# Patient Record
Sex: FEMALE | Race: WHITE | URBAN - METROPOLITAN AREA
[De-identification: names, ages, dates, MRNs, and addresses within clinical notes are randomized per-mention and may not be internally consistent; named-entity substitution may affect disease eponyms.]

---

## 2021-09-16 ENCOUNTER — HOSPITAL ENCOUNTER (INPATIENT)
Age: 31
LOS: 2 days | Discharge: HOME | DRG: 751 | End: 2021-09-19
Attending: EMERGENCY MEDICINE | Admitting: PSYCHIATRY & NEUROLOGY
Payer: MEDICARE

## 2021-09-16 DIAGNOSIS — F32.A DEPRESSION WITH SUICIDAL IDEATION: Primary | ICD-10-CM

## 2021-09-16 DIAGNOSIS — R45.851 DEPRESSION WITH SUICIDAL IDEATION: Primary | ICD-10-CM

## 2021-09-16 LAB
ABSOLUTE EOS #: 0 K/UL (ref 0–0.4)
ABSOLUTE IMMATURE GRANULOCYTE: ABNORMAL K/UL (ref 0–0.3)
ABSOLUTE LYMPH #: 1.8 K/UL (ref 1–4.8)
ABSOLUTE MONO #: 0.5 K/UL (ref 0.1–1.3)
ACETAMINOPHEN LEVEL: <5 UG/ML (ref 10–30)
ALBUMIN SERPL-MCNC: 4.6 G/DL (ref 3.5–5.2)
ALBUMIN/GLOBULIN RATIO: ABNORMAL (ref 1–2.5)
ALP BLD-CCNC: 79 U/L (ref 35–104)
ALT SERPL-CCNC: 13 U/L (ref 5–33)
AMPHETAMINE SCREEN URINE: POSITIVE
ANION GAP SERPL CALCULATED.3IONS-SCNC: 10 MMOL/L (ref 9–17)
AST SERPL-CCNC: 16 U/L
BARBITURATE SCREEN URINE: NEGATIVE
BASOPHILS # BLD: 1 % (ref 0–2)
BASOPHILS ABSOLUTE: 0.1 K/UL (ref 0–0.2)
BENZODIAZEPINE SCREEN, URINE: NEGATIVE
BILIRUB SERPL-MCNC: 0.32 MG/DL (ref 0.3–1.2)
BUN BLDV-MCNC: 6 MG/DL (ref 6–20)
BUN/CREAT BLD: ABNORMAL (ref 9–20)
BUPRENORPHINE URINE: ABNORMAL
CALCIUM SERPL-MCNC: 9.4 MG/DL (ref 8.6–10.4)
CANNABINOID SCREEN URINE: NEGATIVE
CHLORIDE BLD-SCNC: 103 MMOL/L (ref 98–107)
CO2: 25 MMOL/L (ref 20–31)
COCAINE METABOLITE, URINE: NEGATIVE
CREAT SERPL-MCNC: 0.57 MG/DL (ref 0.5–0.9)
DIFFERENTIAL TYPE: ABNORMAL
EOSINOPHILS RELATIVE PERCENT: 0 % (ref 0–4)
ETHANOL PERCENT: <0.01 %
ETHANOL: <10 MG/DL
GFR AFRICAN AMERICAN: >60 ML/MIN
GFR NON-AFRICAN AMERICAN: >60 ML/MIN
GFR SERPL CREATININE-BSD FRML MDRD: ABNORMAL ML/MIN/{1.73_M2}
GFR SERPL CREATININE-BSD FRML MDRD: ABNORMAL ML/MIN/{1.73_M2}
GLUCOSE BLD-MCNC: 105 MG/DL (ref 70–99)
HCG QUALITATIVE: NEGATIVE
HCT VFR BLD CALC: 36.7 % (ref 36–46)
HEMOGLOBIN: 12.6 G/DL (ref 12–16)
IMMATURE GRANULOCYTES: ABNORMAL %
INFLUENZA A: NEGATIVE
INFLUENZA B: NEGATIVE
LYMPHOCYTES # BLD: 16 % (ref 24–44)
MCH RBC QN AUTO: 28.9 PG (ref 26–34)
MCHC RBC AUTO-ENTMCNC: 34.4 G/DL (ref 31–37)
MCV RBC AUTO: 83.9 FL (ref 80–100)
MDMA URINE: ABNORMAL
METHADONE SCREEN, URINE: NEGATIVE
METHAMPHETAMINE, URINE: ABNORMAL
MONOCYTES # BLD: 4 % (ref 1–7)
NRBC AUTOMATED: ABNORMAL PER 100 WBC
OPIATES, URINE: NEGATIVE
OXYCODONE SCREEN URINE: NEGATIVE
PDW BLD-RTO: 13.7 % (ref 11.5–14.9)
PHENCYCLIDINE, URINE: POSITIVE
PLATELET # BLD: 421 K/UL (ref 150–450)
PLATELET ESTIMATE: ABNORMAL
PMV BLD AUTO: 6.8 FL (ref 6–12)
POTASSIUM SERPL-SCNC: 4.3 MMOL/L (ref 3.7–5.3)
PROPOXYPHENE, URINE: ABNORMAL
RBC # BLD: 4.38 M/UL (ref 4–5.2)
RBC # BLD: ABNORMAL 10*6/UL
SALICYLATE LEVEL: <1 MG/DL (ref 3–10)
SARS-COV-2 RNA, RT PCR: NOT DETECTED
SEG NEUTROPHILS: 79 % (ref 36–66)
SEGMENTED NEUTROPHILS ABSOLUTE COUNT: 9.1 K/UL (ref 1.3–9.1)
SODIUM BLD-SCNC: 138 MMOL/L (ref 135–144)
SOURCE: NORMAL
SPECIMEN DESCRIPTION: NORMAL
TEST INFORMATION: ABNORMAL
TOTAL PROTEIN: 8 G/DL (ref 6.4–8.3)
TRICYCLIC ANTIDEPRESSANTS, UR: ABNORMAL
WBC # BLD: 11.6 K/UL (ref 3.5–11)
WBC # BLD: ABNORMAL 10*3/UL

## 2021-09-16 PROCEDURE — G0480 DRUG TEST DEF 1-7 CLASSES: HCPCS

## 2021-09-16 PROCEDURE — 99281 EMR DPT VST MAYX REQ PHY/QHP: CPT

## 2021-09-16 PROCEDURE — 80307 DRUG TEST PRSMV CHEM ANLYZR: CPT

## 2021-09-16 PROCEDURE — 80143 DRUG ASSAY ACETAMINOPHEN: CPT

## 2021-09-16 PROCEDURE — 80053 COMPREHEN METABOLIC PANEL: CPT

## 2021-09-16 PROCEDURE — 84703 CHORIONIC GONADOTROPIN ASSAY: CPT

## 2021-09-16 PROCEDURE — 80179 DRUG ASSAY SALICYLATE: CPT

## 2021-09-16 PROCEDURE — 36415 COLL VENOUS BLD VENIPUNCTURE: CPT

## 2021-09-16 PROCEDURE — 87636 SARSCOV2 & INF A&B AMP PRB: CPT

## 2021-09-16 PROCEDURE — 85025 COMPLETE CBC W/AUTO DIFF WBC: CPT

## 2021-09-16 ASSESSMENT — ENCOUNTER SYMPTOMS
ABDOMINAL PAIN: 0
SHORTNESS OF BREATH: 0
EYE PAIN: 0
BACK PAIN: 0
COLOR CHANGE: 0

## 2021-09-17 PROBLEM — F32.A DEPRESSION WITH SUICIDAL IDEATION: Status: ACTIVE | Noted: 2021-09-17

## 2021-09-17 PROBLEM — F33.2 MDD (MAJOR DEPRESSIVE DISORDER), RECURRENT SEVERE, WITHOUT PSYCHOSIS (HCC): Status: ACTIVE | Noted: 2021-09-17

## 2021-09-17 PROBLEM — F33.2 SEVERE RECURRENT MAJOR DEPRESSION WITHOUT PSYCHOTIC FEATURES (HCC): Status: ACTIVE | Noted: 2021-09-17

## 2021-09-17 PROBLEM — R45.851 DEPRESSION WITH SUICIDAL IDEATION: Status: ACTIVE | Noted: 2021-09-17

## 2021-09-17 PROCEDURE — 1240000000 HC EMOTIONAL WELLNESS R&B

## 2021-09-17 PROCEDURE — 6370000000 HC RX 637 (ALT 250 FOR IP): Performed by: PSYCHIATRY & NEUROLOGY

## 2021-09-17 PROCEDURE — 90792 PSYCH DIAG EVAL W/MED SRVCS: CPT | Performed by: PSYCHIATRY & NEUROLOGY

## 2021-09-17 PROCEDURE — APPSS60 APP SPLIT SHARED TIME 46-60 MINUTES: Performed by: NURSE PRACTITIONER

## 2021-09-17 PROCEDURE — 6370000000 HC RX 637 (ALT 250 FOR IP): Performed by: NURSE PRACTITIONER

## 2021-09-17 RX ORDER — DIPHENHYDRAMINE HYDROCHLORIDE 50 MG/ML
50 INJECTION INTRAMUSCULAR; INTRAVENOUS EVERY 4 HOURS PRN
Status: DISCONTINUED | OUTPATIENT
Start: 2021-09-17 | End: 2021-09-19 | Stop reason: HOSPADM

## 2021-09-17 RX ORDER — TRAZODONE HYDROCHLORIDE 100 MG/1
100 TABLET ORAL NIGHTLY PRN
COMMUNITY

## 2021-09-17 RX ORDER — METHYLPHENIDATE HYDROCHLORIDE 60 MG/1
60 CAPSULE, EXTENDED RELEASE ORAL EVERY MORNING
COMMUNITY

## 2021-09-17 RX ORDER — METHYLPHENIDATE HYDROCHLORIDE 60 MG/1
60 CAPSULE, EXTENDED RELEASE ORAL DAILY
Status: DISCONTINUED | OUTPATIENT
Start: 2021-09-18 | End: 2021-09-19 | Stop reason: HOSPADM

## 2021-09-17 RX ORDER — METHYLPHENIDATE HYDROCHLORIDE 5 MG/1
20 TABLET ORAL
Status: DISCONTINUED | OUTPATIENT
Start: 2021-09-17 | End: 2021-09-17 | Stop reason: CLARIF

## 2021-09-17 RX ORDER — LORAZEPAM 1 MG/1
2 TABLET ORAL EVERY 4 HOURS PRN
Status: DISCONTINUED | OUTPATIENT
Start: 2021-09-17 | End: 2021-09-19 | Stop reason: HOSPADM

## 2021-09-17 RX ORDER — TRAZODONE HYDROCHLORIDE 50 MG/1
50 TABLET ORAL NIGHTLY PRN
Status: DISCONTINUED | OUTPATIENT
Start: 2021-09-17 | End: 2021-09-17

## 2021-09-17 RX ORDER — SERTRALINE HYDROCHLORIDE 100 MG/1
100 TABLET, FILM COATED ORAL NIGHTLY
Status: DISCONTINUED | OUTPATIENT
Start: 2021-09-17 | End: 2021-09-19 | Stop reason: HOSPADM

## 2021-09-17 RX ORDER — SERTRALINE HYDROCHLORIDE 100 MG/1
100 TABLET, FILM COATED ORAL NIGHTLY
COMMUNITY

## 2021-09-17 RX ORDER — HALOPERIDOL 5 MG/ML
5 INJECTION INTRAMUSCULAR EVERY 4 HOURS PRN
Status: DISCONTINUED | OUTPATIENT
Start: 2021-09-17 | End: 2021-09-19 | Stop reason: HOSPADM

## 2021-09-17 RX ORDER — MAGNESIUM HYDROXIDE/ALUMINUM HYDROXICE/SIMETHICONE 120; 1200; 1200 MG/30ML; MG/30ML; MG/30ML
30 SUSPENSION ORAL EVERY 6 HOURS PRN
Status: DISCONTINUED | OUTPATIENT
Start: 2021-09-17 | End: 2021-09-19 | Stop reason: HOSPADM

## 2021-09-17 RX ORDER — IBUPROFEN 400 MG/1
400 TABLET ORAL EVERY 6 HOURS PRN
Status: DISCONTINUED | OUTPATIENT
Start: 2021-09-17 | End: 2021-09-19 | Stop reason: HOSPADM

## 2021-09-17 RX ORDER — TRAZODONE HYDROCHLORIDE 100 MG/1
100 TABLET ORAL NIGHTLY PRN
Status: DISCONTINUED | OUTPATIENT
Start: 2021-09-17 | End: 2021-09-19 | Stop reason: HOSPADM

## 2021-09-17 RX ORDER — LAMOTRIGINE 150 MG/1
150 TABLET ORAL DAILY
Status: DISCONTINUED | OUTPATIENT
Start: 2021-09-17 | End: 2021-09-19 | Stop reason: HOSPADM

## 2021-09-17 RX ORDER — LAMOTRIGINE 150 MG/1
150 TABLET ORAL DAILY
COMMUNITY

## 2021-09-17 RX ORDER — POLYETHYLENE GLYCOL 3350 17 G/17G
17 POWDER, FOR SOLUTION ORAL DAILY PRN
Status: DISCONTINUED | OUTPATIENT
Start: 2021-09-17 | End: 2021-09-19 | Stop reason: HOSPADM

## 2021-09-17 RX ORDER — ACETAMINOPHEN 325 MG/1
650 TABLET ORAL EVERY 4 HOURS PRN
Status: DISCONTINUED | OUTPATIENT
Start: 2021-09-17 | End: 2021-09-19 | Stop reason: HOSPADM

## 2021-09-17 RX ORDER — HALOPERIDOL 5 MG
5 TABLET ORAL EVERY 4 HOURS PRN
Status: DISCONTINUED | OUTPATIENT
Start: 2021-09-17 | End: 2021-09-19 | Stop reason: HOSPADM

## 2021-09-17 RX ORDER — LORAZEPAM 2 MG/ML
2 INJECTION INTRAMUSCULAR EVERY 4 HOURS PRN
Status: DISCONTINUED | OUTPATIENT
Start: 2021-09-17 | End: 2021-09-19 | Stop reason: HOSPADM

## 2021-09-17 RX ORDER — HYDROXYZINE 50 MG/1
50 TABLET, FILM COATED ORAL 3 TIMES DAILY PRN
Status: DISCONTINUED | OUTPATIENT
Start: 2021-09-17 | End: 2021-09-19 | Stop reason: HOSPADM

## 2021-09-17 RX ADMIN — LAMOTRIGINE 150 MG: 150 TABLET ORAL at 15:10

## 2021-09-17 RX ADMIN — SERTRALINE HYDROCHLORIDE 100 MG: 100 TABLET ORAL at 21:05

## 2021-09-17 RX ADMIN — TRAZODONE HYDROCHLORIDE 50 MG: 50 TABLET ORAL at 01:27

## 2021-09-17 RX ADMIN — TRAZODONE HYDROCHLORIDE 100 MG: 100 TABLET ORAL at 21:05

## 2021-09-17 RX ADMIN — HYDROXYZINE HYDROCHLORIDE 50 MG: 50 TABLET ORAL at 21:05

## 2021-09-17 ASSESSMENT — PAIN SCALES - GENERAL
PAINLEVEL_OUTOF10: 0
PAINLEVEL_OUTOF10: 0

## 2021-09-17 ASSESSMENT — PATIENT HEALTH QUESTIONNAIRE - PHQ9: SUM OF ALL RESPONSES TO PHQ QUESTIONS 1-9: 8

## 2021-09-17 ASSESSMENT — SLEEP AND FATIGUE QUESTIONNAIRES
DO YOU HAVE DIFFICULTY SLEEPING: NO
DO YOU USE A SLEEP AID: NO
AVERAGE NUMBER OF SLEEP HOURS: 8

## 2021-09-17 ASSESSMENT — LIFESTYLE VARIABLES: HISTORY_ALCOHOL_USE: NO

## 2021-09-17 NOTE — PLAN OF CARE
[facility]  Initial Interdisciplinary Treatment Plan NO      Original treatment plan Date & Time: 9/17/2021   0834    Admission Type:  Admission Type: Involuntary    Reason for admission:   Reason for Admission: Suicidal to overdose on medications    Estimated Length of Stay:  5-7days  Estimated Discharge Date: to be determined by physician    PATIENT STRENGTHS:  Patient Strengths:Strengths: Medication Compliance, Positive Support  Patient Strengths and Limitations:Limitations: Hopeless about future, Difficulty problem solving/relies on others to help solve problems, Multiple barriers to leisure interests (therapist at [facility]; reports goes on vacations/unable to see her when pt needs her)  Addictive Behavior: Addictive Behavior  In the past 3 months, have you felt or has someone told you that you have a problem with:  : None  Do you have a history of Chemical Use?: No  Do you have a history of Alcohol Use?: No  Do you have a history of Street Drug Abuse?: No  Histroy of Prescripton Drug Abuse?: No  Medical Problems:History reviewed. No pertinent past medical history.   Status EXAM:Status and Exam  Normal: No  Facial Expression: Flat  Affect: Appropriate  Level of Consciousness: Alert  Mood:Normal: No (rate dep 3/10; rate anx 3/10; reported feeling better during assessment)  Mood: Depressed, Anxious  Motor Activity:Normal: Yes  Interview Behavior: Cooperative  Preception: [place] to Person, [place] Mer[place] to Time, [place] to Place, [place] to Situation  Attention:Normal: No  Attention: Distractible  Thought Processes: Blocking  Thought Content:Normal: No  Thought Content: Obsessions, Preoccupations (loss of uncle 3 years ago)  Hallucinations: None (denied current auditory/visual hallucinations)  Delusions: No  Memory:Normal: Yes  Insight and Judgment: No  Insight and Judgment: Poor Judgment, Poor Insight  Present Suicidal Ideation: No (denied current SI, reported feels safe, reported she will come talk to staff if this would change)  Present Homicidal Ideation: No (denied)    EDUCATION:   Learner Progress Toward Treatment Goals: reviewed group plans and strategies for care    Method:group therapy, medication compliance, individualized assessments and care planning    Outcome: needs reinforcement    PATIENT GOALS: to be discussed with patient within 72 hours    PLAN/TREATMENT RECOMMENDATIONS:     continue group therapy , medications compliance, goal setting, individualized assessments and care, continue to monitor pt on unit      SHORT-TERM GOALS:   Time frame for Short-Term Goals: 5-7 days    LONG-TERM GOALS:  Time frame for Long-Term Goals: 6 months  Members Present in Team Meeting: See Signature Sheet    Shayna Mcnally
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
## 2021-09-17 NOTE — GROUP NOTE
Group Therapy Note    Date: 9/17/2021    Group Start Time: 0900  Group End Time: 0915  Group Topic: Community Meeting    DONALDO Lynn        Group Therapy Note    Pt did not attend  community meeting group d/t resting in room despite staff invitation to attend. 1:1 talk time offered as alternative to group session, pt declined.

## 2021-09-17 NOTE — PROGRESS NOTES
Behavioral Services  Medicare Certification Upon Admission    I certify that this patient's inpatient psychiatric hospital admission is medically necessary for:    [x] (1) Treatment which could reasonably be expected to improve this patient's condition,       [x] (2) Or for diagnostic study;     AND     [x](2) The inpatient psychiatric services are provided while the individual is under the care of a physician and are included in the individualized plan of care.     Estimated length of stay/service 3 to 5 days    Plan for post-hospital care Home with outpatient community mental health follow-up    Electronically signed by Margo Tinajero MD on 9/17/2021 at 2:31 PM

## 2021-09-17 NOTE — ED PROVIDER NOTES
EMERGENCY DEPARTMENT ENCOUNTER    Pt Name: Karel Carter  MRN: [id number]  [place] 1990  Date of evaluation: 9/16/21  CHIEF COMPLAINT     No chief complaint on file. HISTORY OF PRESENT ILLNESS   68-year-old female presents for mental health evaluation. Patient reports has been increasingly depressed over the last couple days, patient states that it stems from the loss of her uncle a few years ago after he committed suicide states that she has not that without, states that she is not been able to meet with her therapist recently had been feeling increasingly suicidal in the last couple days. Patient reportedly was trying to overdose last night and police were called, was not brought to the ED yesterday, please were called out again today after patient allegedly tried to take an overdose of pills, was able to be stopped by her mom and patient did not actually take anything. Patient does acknowledge feeling suicidal, does acknowledge trying to take pills, denies any homicidal ideation, does admit prior suicide attempt via overdose, denies any visual or auditory hallucinations, denies any recent alcohol or drug use, denies any other complaints at this time. The history is provided by the patient. REVIEW OF SYSTEMS     Review of Systems   Constitutional: Negative for fever. HENT: Negative for congestion and ear pain. Eyes: Negative for pain. Respiratory: Negative for shortness of breath. Cardiovascular: Negative for chest pain, palpitations and leg swelling. Gastrointestinal: Negative for abdominal pain. Genitourinary: Negative for dysuria and flank pain. Musculoskeletal: Negative for back pain. Skin: Negative for color change. Neurological: Negative for numbness and headaches. Psychiatric/Behavioral: Positive for suicidal ideas. Negative for confusion. All other systems reviewed and are negative. PASTMEDICAL HISTORY   History reviewed.  No pertinent past medical history. Past Problem List  Patient Active Problem List   Diagnosis Code    Depression with suicidal ideation F32.9, R45.851     SURGICAL HISTORY     History reviewed. No pertinent surgical history. CURRENT MEDICATIONS       There are no discharge medications for this patient. ALLERGIES     has No Known Allergies. FAMILY HISTORY     has no family status information on file. SOCIAL HISTORY       Social History     Tobacco Use    Smoking status: Never Smoker    Smokeless tobacco: Never Used   Vaping Use    Vaping Use: Never used   Substance Use Topics    Alcohol use: Never    Drug use: Never     PHYSICAL EXAM     INITIAL VITALS: /87   Pulse 78   Temp 97.8 °F (36.6 °C)   Resp 16   Ht 5' 2\" (1.575 m)   Wt 160 lb (72.6 kg)   SpO2 99%   BMI 29.26 kg/m²    Physical Exam  Vitals and nursing note reviewed. Constitutional:       General: She is not in acute distress. Appearance: Normal appearance. She is not toxic-appearing. HENT:      Head: Normocephalic and atraumatic. Nose: Nose normal.      Mouth/Throat:      Mouth: Mucous membranes are moist.      Pharynx: Oropharynx is clear. Eyes:      Extraocular Movements: Extraocular movements intact. Conjunctiva/sclera: Conjunctivae normal.   Cardiovascular:      Rate and Rhythm: Normal rate and regular rhythm. Pulses: Normal pulses. Heart sounds: Normal heart sounds. Pulmonary:      Effort: Pulmonary effort is normal.      Breath sounds: Normal breath sounds. Abdominal:      General: Bowel sounds are normal. There is no distension. Palpations: Abdomen is soft. Tenderness: There is no abdominal tenderness. Musculoskeletal:         General: Normal range of motion. Cervical back: Normal range of motion. Skin:     General: Skin is warm and dry. Capillary Refill: Capillary refill takes less than 2 seconds. Neurological:      General: No focal deficit present.       Mental Status: She is alert and oriented to person, place, and time. Cranial Nerves: Cranial nerves are intact. Sensory: Sensation is intact. Motor: Motor function is intact. Psychiatric:         Attention and Perception: She does not perceive auditory or visual hallucinations. Mood and Affect: Mood normal. Affect is tearful. Thought Content: Thought content includes suicidal ideation. Thought content includes suicidal plan. MEDICAL DECISION MAKIN-year-old female presents for mental health evaluation. On initial exam patient in no acute distress, vitals are stable, patient does not suicidal ideation and does acknowledge that she was going to take pills tonight, will check labs for medical clearance, patient to be evaluated by social work as well    Labs reviewed and unremarkable    Patient was placed on a pink slip by PD    Discussed with social work, both myself and social work in agreement patient would benefit from admission, patient medically clear         CRITICAL CARE:       PROCEDURES:    Procedures    DIAGNOSTIC RESULTS   EKG:All EKG's are interpreted by the Emergency Department Physician who either signs or Co-signs this chart in the absence of a cardiologist.        RADIOLOGY:All plain film, CT, MRI, and formal ultrasound images (except ED bedside ultrasound) are read by the radiologist, see reports below, unless otherwisenoted in MDM or here. No orders to display     LABS: All lab results were reviewed by myself, and all abnormals are listed below.   Labs Reviewed   CBC WITH AUTO DIFFERENTIAL - Abnormal; Notable for the following components:       Result Value    WBC 11.6 (*)     Seg Neutrophils 79 (*)     Lymphocytes 16 (*)     All other components within normal limits   COMPREHENSIVE METABOLIC PANEL W/ REFLEX TO MG FOR LOW K - Abnormal; Notable for the following components:    Glucose 105 (*)     All other components within normal limits   SALICYLATE LEVEL - Abnormal; Notable for the following components:    Salicylate Lvl <1 (*)     All other components within normal limits   ACETAMINOPHEN LEVEL - Abnormal; Notable for the following components:    Acetaminophen Level <5 (*)     All other components within normal limits   URINE DRUG SCREEN - Abnormal; Notable for the following components:    Amphetamine Screen, Ur POSITIVE (*)     Phencyclidine, Urine POSITIVE (*)     All other components within normal limits   COVID-19 & INFLUENZA COMBO   HCG, SERUM, QUALITATIVE   ETHANOL       EMERGENCY DEPARTMENTCOURSE:         Vitals:    Vitals:    09/16/21 2230 09/17/21 0120   BP: 110/63 100/87   Pulse: 61 78   Resp: 16 16   Temp: 97.7 °F (36.5 °C) 97.8 °F (36.6 °C)   TempSrc: Oral    SpO2: 100% 99%   Weight: 160 lb (72.6 kg) 160 lb (72.6 kg)   Height: 5' 2\" (1.575 m) 5' 2\" (1.575 m)       The patient was given the following medications while in the emergency department:  Orders Placed This Encounter   Medications    acetaminophen (TYLENOL) tablet 650 mg    aluminum & magnesium hydroxide-simethicone (MAALOX) 200-200-20 MG/5ML suspension 30 mL    hydrOXYzine (ATARAX) tablet 50 mg    ibuprofen (ADVIL;MOTRIN) tablet 400 mg    DISCONTD: traZODone (DESYREL) tablet 50 mg    polyethylene glycol (GLYCOLAX) packet 17 g    nicotine polacrilex (NICORETTE) gum 2 mg    AND Linked Order Group     haloperidol (HALDOL) tablet 5 mg     LORazepam (ATIVAN) tablet 2 mg    AND Linked Order Group     haloperidol lactate (HALDOL) injection 5 mg     LORazepam (ATIVAN) injection 2 mg     diphenhydrAMINE (BENADRYL) injection 50 mg    traZODone (DESYREL) tablet 50 mg     CONSULTS:  None    FINAL IMPRESSION      1. Depression with suicidal ideation          DISPOSITION/PLAN   DISPOSITION Admitted 09/17/2021 12:05:22 AM      PATIENT REFERRED TO:  No follow-up provider specified. DISCHARGE MEDICATIONS:  There are no discharge medications for this patient.     [facility] DO  Attending Emergency Physician                  Radhika Shaikh,   09/17/21 8058
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
## 2021-09-17 NOTE — ED NOTES
Paged lab at 1532 per [place] in the [facility].      Jayce Barkley  09/16/21 2059
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
## 2021-09-17 NOTE — BH NOTE
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
## 2021-09-17 NOTE — GROUP NOTE
Group Therapy Note    Date: 9/17/2021    Group Start Time: 1330  Group End Time: 1440  Group Topic: Music Therapy    [facility]    [facility]        Group Therapy Note    Pt did not attend MUSIC THERAPY/EMOITONS group d/t resting in room despite staff invitation to attend. 1:1 talk time offered as alternative to group session, pt declined.

## 2021-09-18 PROCEDURE — 6370000000 HC RX 637 (ALT 250 FOR IP): Performed by: NURSE PRACTITIONER

## 2021-09-18 PROCEDURE — 99232 SBSQ HOSP IP/OBS MODERATE 35: CPT | Performed by: PSYCHIATRY & NEUROLOGY

## 2021-09-18 PROCEDURE — APPSS15 APP SPLIT SHARED TIME 0-15 MINUTES: Performed by: NURSE PRACTITIONER

## 2021-09-18 PROCEDURE — 1240000000 HC EMOTIONAL WELLNESS R&B

## 2021-09-18 PROCEDURE — 6370000000 HC RX 637 (ALT 250 FOR IP): Performed by: PSYCHIATRY & NEUROLOGY

## 2021-09-18 RX ADMIN — LAMOTRIGINE 150 MG: 150 TABLET ORAL at 08:25

## 2021-09-18 RX ADMIN — TRAZODONE HYDROCHLORIDE 100 MG: 100 TABLET ORAL at 21:47

## 2021-09-18 RX ADMIN — SERTRALINE HYDROCHLORIDE 100 MG: 100 TABLET ORAL at 21:47

## 2021-09-18 RX ADMIN — METHYLPHENIDATE HYDROCHLORIDE 60 MG: 60 CAPSULE, EXTENDED RELEASE ORAL at 08:25

## 2021-09-18 RX ADMIN — HYDROXYZINE HYDROCHLORIDE 50 MG: 50 TABLET ORAL at 21:47

## 2021-09-18 NOTE — PROGRESS NOTES
Daily Progress Note  9/18/2021    Patient Name: Burgess Yost    CHIEF COMPLAINT: Depression with suicidal ideation         SUBJECTIVE:      Patient is seen today for a follow up assessment. She is interviewed in the sensory room, she was observed previously watching television on the milieu with peers. She reports that she is feeling \"a lot more hopeful\". She reports that she has felt very supported by staff and reports gratitude that she has been able to express herself emotionally without fearing punitive prolonged hospitalization. She reports that she has been able to talk to some of her coworkers who have also been supportive of her situation. She states that she spent time with social work going over potential new places for therapy. She feels hopeful that if she is able to have regular therapist to help stabilize her emotional lability things will a lot better for her. She does endorse that without that she feels that she will quickly decompensate because she is unable to handle her current life stressors. She endorses improved suicidal ideation specifically because of the structured environment. She is hopeful that she will be able to return to work this week, we did explore the importance of stability of symptoms prior to discharge to make sure that there is a good plan in place so that she does not decompensate and require further hospitalization. She is agreeable with this plan of care, she has been compliant with medications.     Appetite:  [x] Adequate/Unchanged  [] Increased  [] Decreased      Sleep:       [x] Adequate/Unchanged  [] Fair  [] Poor      Group Attendance on Unit:   [] Yes  [x] Selectively    [] No    Medication Side Effects: Denies         Mental Status Exam  Level of consciousness: Alert and awake   Appearance: Appropriate attire for setting, seated in chair, with good  grooming and hygiene   Behavior/Motor: Approachable, pleasant  Attitude toward examiner: Cooperative, attentive, avoids eye contact  Speech: Normal rate, volume and tone  Mood: \"Better\"  Affect: Congruent, less withdrawn  Thought processes: Linear, coherent and goal directed  Thought content: Forward thinking, discharge focused, Denies homicidal ideation  Suicidal Ideation: Reports improvement in suicidal ideations, contracts for safety on the unit. Delusions: No evidence of delusions. Perceptual Disturbance: Denies, patient does not appear to be responding to internal stimuli. Cognition: Oriented to self, location, time, and situation  Memory: intact  Insight: fair   Judgement: poor       Data   height is 5' 2\" (1.575 m) and weight is 160 lb (72.6 kg). Her oral temperature is 96.7 °F (35.9 °C). Her blood pressure is 106/64 and her pulse is 95. Her respiration is 14 and oxygen saturation is 98%.    Labs:   Admission on 09/16/2021   Component Date Value Ref Range Status    WBC 09/16/2021 11.6* 3.5 - 11.0 k/uL Final    RBC 09/16/2021 4.38  4.0 - 5.2 m/uL Final    Hemoglobin 09/16/2021 12.6  12.0 - 16.0 g/dL Final    Hematocrit 09/16/2021 36.7  36 - 46 % Final    MCV 09/16/2021 83.9  80 - 100 fL Final    MCH 09/16/2021 28.9  26 - 34 pg Final    MCHC 09/16/2021 34.4  31 - 37 g/dL Final    RDW 09/16/2021 13.7  11.5 - 14.9 % Final    Platelets 61/40/0386 421  150 - 450 k/uL Final    MPV 09/16/2021 6.8  6.0 - 12.0 fL Final    NRBC Automated 09/16/2021 NOT REPORTED  per 100 WBC Final    Differential Type 09/16/2021 NOT REPORTED   Final    Seg Neutrophils 09/16/2021 79* 36 - 66 % Final    Lymphocytes 09/16/2021 16* 24 - 44 % Final    Monocytes 09/16/2021 4  1 - 7 % Final    Eosinophils % 09/16/2021 0  0 - 4 % Final    Basophils 09/16/2021 1  0 - 2 % Final    Immature Granulocytes 09/16/2021 NOT REPORTED  0 % Final    Segs Absolute 09/16/2021 9.10  1.3 - 9.1 k/uL Final    Absolute Lymph # 09/16/2021 1.80  1.0 - 4.8 k/uL Final    Absolute Mono # 09/16/2021 0.50  0.1 - 1.3 k/uL Final    Absolute Eos # 09/16/2021 0.00  0.0 - 0.4 k/uL Final    Basophils Absolute 09/16/2021 0.10  0.0 - 0.2 k/uL Final    Absolute Immature Granulocyte 09/16/2021 NOT REPORTED  0.00 - 0.30 k/uL Final    WBC Morphology 09/16/2021 NOT REPORTED   Final    RBC Morphology 09/16/2021 NOT REPORTED   Final    Platelet Estimate 67/90/4155 NOT REPORTED   Final    Glucose 09/16/2021 105* 70 - 99 mg/dL Final    BUN 09/16/2021 6  6 - 20 mg/dL Final    CREATININE 09/16/2021 0.57  0.50 - 0.90 mg/dL Final    Bun/Cre Ratio 09/16/2021 NOT REPORTED  9 - 20 Final    Calcium 09/16/2021 9.4  8.6 - 10.4 mg/dL Final    Sodium 09/16/2021 138  135 - 144 mmol/L Final    Potassium 09/16/2021 4.3  3.7 - 5.3 mmol/L Final    Chloride 09/16/2021 103  98 - 107 mmol/L Final    CO2 09/16/2021 25  20 - 31 mmol/L Final    Anion Gap 09/16/2021 10  9 - 17 mmol/L Final    Alkaline Phosphatase 09/16/2021 79  35 - 104 U/L Final    ALT 09/16/2021 13  5 - 33 U/L Final    AST 09/16/2021 16  <32 U/L Final    Total Bilirubin 09/16/2021 0.32  0.3 - 1.2 mg/dL Final    Total Protein 09/16/2021 8.0  6.4 - 8.3 g/dL Final    Albumin 09/16/2021 4.6  3.5 - 5.2 g/dL Final    Albumin/Globulin Ratio 09/16/2021 NOT REPORTED  1.0 - 2.5 Final    GFR Non- 09/16/2021 >60  >60 mL/min Final    GFR  09/16/2021 >60  >60 mL/min Final    GFR Comment 09/16/2021        Final    Comment: Average GFR for 20-28 years old:   80 mL/min/1.73sq m  Chronic Kidney Disease:   <60 mL/min/1.73sq m  Kidney failure:   <15 mL/min/1.73sq m              eGFR calculated using average adult body mass. Additional eGFR calculator available at:        Popdust.br            GFR Staging 09/16/2021 NOT REPORTED   Final    hCG Qual 09/16/2021 NEGATIVE  NEGATIVE Final    Comment: Specimens with hCG levels near the threshold of the test (25 mIU/mL) may give a negative or   indeterminate result.   In such cases, another test should be performed with a new specimen   in 48-72 hours. If early pregnancy is suspected clinically in this setting, correlation   with quantitative serum b-hCG level is suggested.  Ethanol 09/16/2021 <10  <10 mg/dL Final    Ethanol percent 09/16/2021 <6.684  % Final    Salicylate Lvl 99/56/7686 <1* 3 - 10 mg/dL Final    Acetaminophen Level 09/16/2021 <5* 10 - 30 ug/mL Final    Amphetamine Screen, Ur 09/16/2021 POSITIVE* NEGATIVE Final    Comment:       (Positive cutoff 1000 ng/mL)                  Barbiturate Screen, Ur 09/16/2021 NEGATIVE  NEGATIVE Final    Comment:       (Positive cutoff 200 ng/mL)                  Benzodiazepine Screen, Urine 09/16/2021 NEGATIVE  NEGATIVE Final    Comment:       (Positive cutoff 200 ng/mL)                  Cocaine Metabolite, Urine 09/16/2021 NEGATIVE  NEGATIVE Final    Comment:       (Positive cutoff 300 ng/mL)                  Methadone Screen, Urine 09/16/2021 NEGATIVE  NEGATIVE Final    Comment:       (Positive cutoff 300 ng/mL)                  Opiates, Urine 09/16/2021 NEGATIVE  NEGATIVE Final    Comment:       (Positive cutoff 300 ng/mL)                  Phencyclidine, Urine 09/16/2021 POSITIVE* NEGATIVE Final    Comment:       (Positive cutoff 25 ng/mL)                  Propoxyphene, Urine 09/16/2021 NOT REPORTED  NEGATIVE Final    Cannabinoid Scrn, Ur 09/16/2021 NEGATIVE  NEGATIVE Final    Comment:       (Positive cutoff 50 ng/mL)                  Oxycodone Screen, Ur 09/16/2021 NEGATIVE  NEGATIVE Final    Comment:       (Positive cutoff 100 ng/mL)                  Methamphetamine, Urine 09/16/2021 NOT REPORTED  NEGATIVE Final    Tricyclic Antidepressants, Urine 09/16/2021 NOT REPORTED  NEGATIVE Final    MDMA, Urine 09/16/2021 NOT REPORTED  NEGATIVE Final    Buprenorphine Urine 09/16/2021 NOT REPORTED  NEGATIVE Final    Test Information 09/16/2021 Assay provides medical screening only.   The absence of expected drug(s) and/or metabolite(s) may indicate diluted or adulterated urine, limitations of testing or timing of collection. Final    Comment: Testing for legal purposes should be confirmed by another method. To request confirmation   of test result, please call the lab within 7 days of sample submission.  Specimen Description 09/16/2021 . NASOPHARYNGEAL SWAB   Final    Source 09/16/2021 . NASOPHARYNGEAL SWAB   Final    SARS-CoV-2 RNA, RT PCR 09/16/2021 Not Detected  Not Detected Final    Comment:       Testing was performed using [employer] In[employer] SARS-CoV-2 and Influenza A/B nucleic acid assay. This test is a multiplex Real-Time Reverse Transcriptase Polymerase Chain Reaction   (RT-PCR)-based in vitro diagnostic test intended for the qualitative detection of nucleic   acids from SARS-CoV-2,  influenza A, and influenza B in nasopharyngeal and nasal swab specimens for use under the   FDA's Emergency Use Authorization (EUA) only. Not Detected results do not preclude SARS-CoV-2 infection and should not be used as the sole   basis for patient management decisions. Negative results must be combined with clinical observations, patient history, and   epidemiological information. Fact sheet for Patients: FindDrives.pl  Fact sheet for Healthcare Providers: FindDrives.pl      INFLUENZA A 09/16/2021 NEGATIVE  NEGATIVE Final    INFLUENZA B 09/16/2021 NEGATIVE  NEGATIVE Final         Reviewed patient's current plan of care and vital signs with nursing staff.     Labs reviewed: [x] Yes    Medications  Current Facility-Administered Medications: acetaminophen (TYLENOL) tablet 650 mg, 650 mg, Oral, Q4H PRN  aluminum & magnesium hydroxide-simethicone (MAALOX) 200-200-20 MG/5ML suspension 30 mL, 30 mL, Oral, Q6H PRN  hydrOXYzine (ATARAX) tablet 50 mg, 50 mg, Oral, TID PRN  ibuprofen (ADVIL;MOTRIN) tablet 400 mg, 400 mg, Oral, Q6H PRN  polyethylene glycol (GLYCOLAX) packet 17 g, 17 g, Oral, Daily PRN  nicotine polacrilex (NICORETTE) gum 2 mg, 2 mg, Oral, PRN  haloperidol (HALDOL) tablet 5 mg, 5 mg, Oral, Q4H PRN **AND** LORazepam (ATIVAN) tablet 2 mg, 2 mg, Oral, Q4H PRN  haloperidol lactate (HALDOL) injection 5 mg, 5 mg, IntraMUSCular, Q4H PRN **AND** LORazepam (ATIVAN) injection 2 mg, 2 mg, IntraMUSCular, Q4H PRN **AND** diphenhydrAMINE (BENADRYL) injection 50 mg, 50 mg, IntraMUSCular, Q4H PRN  lamoTRIgine (LAMICTAL) tablet 150 mg, 150 mg, Oral, Daily  sertraline (ZOLOFT) tablet 100 mg, 100 mg, Oral, Nightly  traZODone (DESYREL) tablet 100 mg, 100 mg, Oral, Nightly PRN  methylphenidate (METADATE CD) extended release capsule 60 mg, 60 mg, Oral, Daily    ASSESSMENT  MDD (major depressive disorder), recurrent severe, without psychosis ([facility] [address]         PLAN  Patient symptoms are: Improving  Continue current medication regimen. Monitor need and frequency of PRN medications. Encourage participation in groups and milieu. Attempt to develop insight. Psycho-education conducted. Supportive Therapy conducted. Probable discharge is per attending physician. Successful discharge plan for this patient will include access to regular therapy, ideally through [place] since that is where her medication management occurs. Follow-up daily while inpatient. Patient continues to be monitored in the inpatient psychiatric facility at [facility] for safety and stabilization. Patient continues to need, on a daily basis, active treatment furnished directly by or requiring the supervision of inpatient psychiatric personnel. Electronically signed by LORI Alicea CNP on 9/18/2021 at 1:39 PM    **This report has been created using voice recognition software. It may contain minor errors which are inherent in voice recognition technology. **    I independently saw and evaluated the patient. I reviewed the nurse practitioners documentation above.   Any additional comments or changes to the nurse practitioners documentation are

## 2021-09-18 NOTE — PLAN OF CARE
5 [facility]  Day 3 Interdisciplinary Treatment Plan NOTE    Review Date & Time: 9/18/2021 5490    Admission Type:   Admission Type: Involuntary    Reason for admission:  Reason for Admission: Suicidal to overdose on medications  Estimated Length of Stay: 5-7 days  Estimated Discharge Date Update: to be determined by physician    PATIENT STRENGTHS:  Patient Strengths Strengths: Medication Compliance, Positive Support  Patient Strengths and Limitations:Limitations: Unrealistic self-view, External locus of control, Demonstrates discomfort with /lack of social skills, Difficult relationships / poor social skills, Difficulty problem solving/relies on others to help solve problems  Addictive Behavior:Addictive Behavior  In the past 3 months, have you felt or has someone told you that you have a problem with:  : None  Do you have a history of Chemical Use?: No  Do you have a history of Alcohol Use?: No  Do you have a history of Street Drug Abuse?: No  Histroy of Prescripton Drug Abuse?: No  Medical Problems:History reviewed. No pertinent past medical history.     Risk:  Fall RiskTotal: 53  Omid Scale Omid Scale Score: 22  BVC Total: 0  Change in scores no Changes to plan of Care no    Status EXAM:   Status and Exam  Normal: No  Facial Expression: Sad, Worried  Affect: Appropriate  Level of Consciousness: Alert  Mood:Normal: No  Mood: Depressed, Anxious  Motor Activity:Normal: Yes  Interview Behavior: Cooperative  Preception: [place] to Person, Yolis Colander to Time, [place] to Place, [place] to Situation  Attention:Normal: No  Attention: Distractible  Thought Processes: Blocking  Thought Content:Normal: No  Thought Content: Preoccupations  Hallucinations: None  Delusions: No  Memory:Normal: No  Memory: Poor Recent  Insight and Judgment: No  Insight and Judgment: Poor Judgment, Poor Insight  Present Suicidal Ideation: No  Present Homicidal Ideation: No    Daily Assessment Last Entry:   Daily Sleep (WDL): Within Defined Limits         Patient Currently in Pain: Denies  Daily Nutrition (WDL): Within Defined Limits    Patient Monitoring:  Frequency of Checks: 4 times per hour, close    Psychiatric Symptoms:   Depression Symptoms  Depression Symptoms: Isolative  Anxiety Symptoms  Anxiety Symptoms: Generalized  Liz Symptoms  Liz Symptoms: No problems reported or observed. Psychosis Symptoms  Delusion Type: No problems reported or observed. Suicide Risk CSSR-S:  1) Within the past month, have you wished you were dead or wished you could go to sleep and not wake up? : Yes  2) Have you actually had any thoughts of killing yourself? : Yes  3) Have you been thinking about how you might kill yourself? : Yes  5) Have you started to work out or worked out the details of how to kill yourself?  Do you intend to carry out this plan? : Yes  6) Have you ever done anything, started to do anything, or prepared to do anything to end your life?: Yes  Change in Result  NA  Change in Plan of care  NA       EDUCATION:   EDUCATION:   Learner Progress Toward Treatment Goals: Reviewed results and recommendations of this team, Reviewed group plan and strategies, Reviewed signs, symptoms and risk of self harm and violent behavior, Reviewed goals and plan of care    Method:small group, individual verbal education    Outcome:verbalized by patient, but needs reinforcement to obtain goals    PATIENT GOALS:  Short term: Patient stated being linked with a therapist, linked with grief therapy   Long term: Patient stated continue follow up care, utilize coping skills     PLAN/TREATMENT RECOMMENDATIONS UPDATE: continue with group therapies, increased socialization, continue planning for after discharge goals, continue with medication compliance    SHORT-TERM GOALS UPDATE:   Time frame for Short-Term Goals: 5-7 days    LONG-TERM GOALS UPDATE:   Time frame for Long-Term Goals: 6 months  Members Present in Team Meeting: See Signature Sheet    [employer]

## 2021-09-18 NOTE — PROGRESS NOTES
patient refused to attend wellness group at 21  after encouragement from staff. 1:1 talk time provided as alternative to group session.

## 2021-09-18 NOTE — PLAN OF CARE
Problem: Altered Mood, Depressive Behavior:  Goal: Able to verbalize and/or display a decrease in depressive symptoms  Description: Able to verbalize and/or display a decrease in depressive symptoms  9/18/2021 1652 by Amy Ramos RN  Outcome: Ongoing     Problem: Altered Mood, Depressive Behavior:  Goal: Ability to disclose and discuss suicidal ideas will improve  Description: Ability to disclose and discuss suicidal ideas will improve  9/18/2021 1652 by Amy Ramos RN  Outcome: Ongoing     Problem: Suicide risk  Goal: Provide patient with safe environment  Description: Provide patient with safe environment  9/18/2021 1652 by Amy Ramos RN  Outcome: Ongoing   Patient denies thoughts of self harm during this shift. Patient reports she feels better today and wants to go home. Patient is out and social with peers, and cooperative with staff during shift assessment. Q15 minute and random safety checks maintained.

## 2021-09-18 NOTE — GROUP NOTE
Group Therapy Note    Date: 9/18/2021    Group Start Time: 1430  Group End Time: 8005  Group Topic: Group Documentation    [address], RN        Group Therapy Note    Attendees: 9/19         Patient's Goal:  Improvement in mood through activity and socialization    Notes:  Pt was attentive and active in group    Status After Intervention:  Improved    Participation Level: Interactive    Participation Quality: Appropriate, Attentive, Sharing and Supportive      Speech:  normal      Thought Process/Content: Logical      Affective Functioning: Congruent      Mood: appropriate      Level of consciousness:  Alert, Oriented x4 and Attentive      Response to Learning: Able to verbalize current knowledge/experience and Able to verbalize/acknowledge new learning      Endings: None Reported    Modes of Intervention: Socialization and Activity      Discipline Responsible: Registered Nurse      Signature:  Maritza Paula RN

## 2021-09-19 VITALS
TEMPERATURE: 96.8 F | HEART RATE: 60 BPM | RESPIRATION RATE: 14 BRPM | HEIGHT: 62 IN | WEIGHT: 160 LBS | SYSTOLIC BLOOD PRESSURE: 88 MMHG | BODY MASS INDEX: 29.44 KG/M2 | DIASTOLIC BLOOD PRESSURE: 45 MMHG | OXYGEN SATURATION: 98 %

## 2021-09-19 PROCEDURE — 6370000000 HC RX 637 (ALT 250 FOR IP): Performed by: NURSE PRACTITIONER

## 2021-09-19 PROCEDURE — 99239 HOSP IP/OBS DSCHRG MGMT >30: CPT | Performed by: PSYCHIATRY & NEUROLOGY

## 2021-09-19 RX ORDER — HYDROXYZINE 50 MG/1
50 TABLET, FILM COATED ORAL 3 TIMES DAILY PRN
Qty: 30 TABLET | Refills: 0 | Status: SHIPPED | OUTPATIENT
Start: 2021-09-19 | End: 2021-09-29

## 2021-09-19 RX ADMIN — METHYLPHENIDATE HYDROCHLORIDE 60 MG: 60 CAPSULE, EXTENDED RELEASE ORAL at 07:43

## 2021-09-19 RX ADMIN — LAMOTRIGINE 150 MG: 150 TABLET ORAL at 07:44

## 2021-09-19 NOTE — GROUP NOTE
Group Therapy Note    Date: 9/19/2021    Group Start Time: 0900  Group End Time: 9975  Group Topic: Community Meeting    DONALDO Bangura        Group Therapy Note    Attendees: 9/20         Patient's Goal:  Discharge    Notes:  calm    Status After Intervention:  Unchanged    Participation Level: Active Listener    Participation Quality: Appropriate      Speech:  normal      Thought Process/Content: Logical      Affective Functioning: Congruent      Mood: stable      Level of consciousness:  Alert      Response to Learning: Progressing to goal      Endings: None Reported    Modes of Intervention: Education      Discipline Responsible: Behavorial Health Tech      Signature:   Cat Bangura

## 2021-09-19 NOTE — GROUP NOTE
Group Therapy Note    Date: 9/19/2021    Group Start Time: 1010  Group End Time: 1050  Group Topic: Psychoeducation    Χαλκοκονδύλη Perez, LSW        Group Therapy Note    Attendees: 7/20         Patient's Goal:  Benefits of self care     Notes:  Therapeutic worksheet provided and discussed     Status After Intervention:  Improved    Participation Level:  Active Listener and Interactive    Participation Quality: Appropriate and Attentive      Speech:  normal      Thought Process/Content: Logical      Affective Functioning: Congruent      Mood: euthymic      Level of consciousness:  Alert and Oriented x4      Response to Learning: Able to verbalize current knowledge/experience and Able to verbalize/acknowledge new learning      Endings: None Reported    Modes of Intervention: Education and Support      Discipline Responsible: /Counselor      Signature:  JAYMIE Archuleta

## 2021-09-19 NOTE — SUICIDE SAFETY PLAN
SAFETY PLAN    A suicide Safety Plan is a document that supports someone when they are having thoughts of suicide. Warning Signs that indicate a suicidal crisis may be developing: What (situations, thoughts, feelings, body sensations, behaviors, etc.) do you experience that lets you know you are beginning to think about suicide? 1. hopelessness  2. worthlessness  3. My uncle bill suicide and I want to join him    Internal Coping Strategies:  What things can I do (relaxation techniques, hobbies, physical activities, etc.) to take my mind off my problems without contacting another person? 1. Deep breathing   2. prayer  3. Writing down my problems, thoughts, feelings on paper and throwing them into the fire      People and social settings that provide distraction: Who can I call or where can I go to distract me? 1. Place: At work the walk in fridge and freezer    2. Place: My room    3. Place: Outside           People whom I can ask for help: Who can I call when I need help - for example, friends, family, clergy, someone else? 1. Name: Mom   2. Name: [facility]   3. Place: [facility] or [address] [place] [address] I can contact during a crisis: Who can I call for help - for example, my doctor, my psychiatrist, my psychologist, a mental health provider, a suicide hotline? 1. Suicide Prevention Lifeline: 8-301-548-TALK (3274)    2. [address] [place] [address] Emergency Services -  for example, [facility] suicide hotline, [facility] Hotline: [facility]       Emergency Services Address: [address] [place] [address]      Emergency Services Phone: [phone]    Making the environment safe: How can I make my environment (house/apartment/living space) safer? For example, can I remove guns, medications, and other items? 1. Remove pills   2.  Have punching bag

## 2021-09-19 NOTE — PLAN OF CARE
Problem: Altered Mood, Depressive Behavior:  Goal: Able to verbalize and/or display a decrease in depressive symptoms  Description: Able to verbalize and/or display a decrease in depressive symptoms  9/18/2021 2157 by Lauren Goodwin RN  Outcome: Ongoing   Pt denies depressive symptoms at this time, pt is aloof of peers. Pt relates she feels ready for discharge. Problem: Altered Mood, Depressive Behavior:  Goal: Ability to disclose and discuss suicidal ideas will improve  Description: Ability to disclose and discuss suicidal ideas will improve  9/18/2021 2157 by Lauren Goodwin RN  Outcome: Ongoing   Pt denies suicidal thoughts at this time. Problem: Suicide risk  Goal: Provide patient with safe environment  Description: Provide patient with safe environment  9/18/2021 2157 by Lauren Goodwin RN  Outcome: Ongoing   Pt safe on unit.

## 2021-09-19 NOTE — DISCHARGE SUMMARY
Provider Discharge Summary     Patient ID:  Bam Langston  [id number]  10 y.o.  1990    Admit date: 9/16/2021    Discharge date and time: 9/19/2021  3:42 PM     Admitting Physician: Divina Cunningham MD     Discharge Physician: Camila Whittington MD    Admission Diagnoses: Depression with suicidal ideation [F32.9, R45.851]  Severe recurrent major depression without psychotic features ([address] [F33.2]    Discharge Diagnoses:      MDD (major depressive disorder), recurrent severe, without psychosis ([address]     Patient Active Problem List   Diagnosis Code    Depression with suicidal ideation F32.9, R39.200    MDD (major depressive disorder), recurrent severe, without psychosis ([address] F33.2    Severe recurrent major depression without psychotic features ([address] F33.2        Admission Condition: poor    Discharged Condition: stable    Indication for Admission: threat to self    History of Present Illnes (present tense wording is of findings from admission exam and are not necessarily indicative of current findings):   Bam Langston is a 31 y.o. female who has a past medical history of ADHD, Asperger's, craniostosis depression, hypothyroid, migraines, OCD and PTSD. Patient presented to the [facility] ED via [facility] police after threatening to overdose on all of her medications.  According to ED documentation: Patient reports has been increasingly depressed over the last couple days, patient states that it stems from the loss of her uncle a few years ago after he committed suicide states that she has not that without, states that she is not been able to meet with her therapist recently had been feeling increasingly suicidal in the last couple days. Cale Rodríguez reportedly was trying to overdose last night and police were called, was not brought to the ED yesterday, please were called out again today after patient allegedly tried to take an overdose of pills, was able to be stopped by her mom and patient did not actually take suicide and she is really having a hard time with it. She reports that she is very anxious about the status of her current job, she reports working at [employer] and feels that this hospitalization may cause her to lose her job. Hospital Course:   Upon admission, José Roman was provided a safe secure environment, introduced to unit milieu. Patient participated in groups and individual therapies. Meds were adjusted as noted below. After few days of hospital care, patient began to feel improvement. Depression lifted, thoughts to harm self ceased. Sleep improved, appetite was good. On morning rounds 9/19/2021, José Roman  endorses feeling ready for discharge. Patient denies suicidal or homicidal ideations, denies hallucinations or delusions. Denies SE's from meds. It was decided that maximum benefit from hospital care had been achieved and patient can be discharged. Consults:   No consults    Significant Diagnostic Studies: Routine labs and diagnostics    Treatments: Psychotropic medications, therapy with group, milieu, and 1:1 with nurses, social workers, PA-C/CNP, and Attending physician. Discharge Medications:  Discharge Medication List as of 9/19/2021 11:54 AM      START taking these medications    Details   hydrOXYzine (ATARAX) 50 MG tablet Take 1 tablet by mouth 3 times daily as needed for Anxiety, Disp-30 tablet, R-0Normal         CONTINUE these medications which have NOT CHANGED    Details   sertraline (ZOLOFT) 100 MG tablet Take 100 mg by mouth nightlyHistorical Med      traZODone (DESYREL) 100 MG tablet Take 100 mg by mouth nightly as needed for SleepHistorical Med      lamoTRIgine (LAMICTAL) 150 MG tablet Take 150 mg by mouth dailyHistorical Med      methylphenidate (METADATE CD) 60 MG extended release capsule Take 60 mg by mouth every morning. Historical Med              Core Measures statement:   Not applicable    Discharge Exam:  Level of consciousness:  Within normal limits  Appearance: Street clothes, seated, with good grooming  Behavior/Motor: No abnormalities noted  Attitude toward examiner:  Cooperative, attentive, good eye contact  Speech:  spontaneous, normal rate, normal volume and well articulated  Mood:  euthymic  Affect:  Full range  Thought processes:  linear, goal directed and coherent  Thought content:  denies homicidal ideation  Suicidal Ideation:  denies suicidal ideation  Delusions:  no evidence of delusions  Perceptual Disturbance:  denies any perceptual disturbance  Cognition:  Intact  Memory: age appropriate  Insight & Judgement: fair  Medication side effects: denies     Disposition: home    Patient Instructions: Activity: activity as tolerated  1. Patient instructed to take medications regularly and follow up with outpatient appointments. Follow-up as scheduled with outpatient [facility]      Signed:    Electronically signed by Melissa Painter MD on 9/19/21 at 3:42 PM EDT    Time Spent on discharge is more than 30 minutes in the examination, evaluation, counseling and review of medications and discharge plan.

## 2021-09-19 NOTE — BH NOTE
Patient stated she does not smoke. Nurse continues to reinforce the dangers of long term tobacco use and why tobacco cessation is important to patient.

## 2021-09-19 NOTE — BH NOTE
[address] [facility]  Discharge Note    Pt discharged with followings belongings:   Dentures: None  Vision - Corrective Lenses: None  Hearing Aid: None  Jewelry: None  Body Piercings Removed: N/A  Clothing: Footwear, Pants, Shirt, Undergarments (Comment)  Were All Patient Medications Collected?: Not Applicable  Other Valuables: Money (Comment), Wallet (27.52)   Valuables sent home with patient. Valuables retrieved from safe, Security envelope number:  [id number] and returned to patient. Patient left department with mother   via car  , discharged to private property   . Patient education on aftercare instructions: yes Patient verbalize understanding of AVS:  yes.     Status EXAM upon discharge:  Status and Exam  Normal: Yes  Facial Expression: Brightened  Affect: Appropriate  Level of Consciousness: Alert  Mood:Normal: Yes  Mood: Anxious  Motor Activity:Normal: Yes  Interview Behavior: Cooperative  Preception: [place] to Person, Kerrie Spar to Time, [place] to Place, [place] to Situation  Attention:Normal: Yes  Attention: Distractible  Thought Processes: Circumstantial  Thought Content:Normal: Yes  Thought Content: Preoccupations  Hallucinations: None  Delusions: No  Memory:Normal: Yes  Memory: Poor Recent  Insight and Judgment: Yes  Insight and Judgment: Poor Insight  Present Suicidal Ideation: No  Present Homicidal Ideation: No    Mark Blood LPN

## 2021-09-19 NOTE — GROUP NOTE
Group Therapy Note    Date: 9/18/2021    Group Start Time: 2030  Group End Time: 2059  Group Topic: Wrap-Up    DONALDO King Begun        Group Therapy Note    Attendees: 6         Patient's Goal:  I feel better & I want to go home    Notes:  I am working on finding a new counselor, I'm changing to [facility]    Status After Intervention:  Improved    Participation Level:  Active Listener and Interactive    Participation Quality: Appropriate, Attentive and Sharing      Speech:  normal      Thought Process/Content: Logical      Affective Functioning: Congruent      Mood: anxious      Level of consciousness:  Alert, Oriented x4 and Attentive      Response to Learning: Capable of insight      Endings: None Reported    Modes of Intervention: Problem-solving      Discipline Responsible: [place]      Signature:  Fernando Anthony

## 2021-09-20 NOTE — CARE COORDINATION
Name: Juliana Woods    : 1990    Discharge Date: 21    Primary Auth/Cert #: [id number]    Destination: Private residence    Discharge Medications:      Medication List      START taking these medications    hydrOXYzine 50 MG tablet  Commonly known as: ATARAX  Take 1 tablet by mouth 3 times daily as needed for Anxiety  Notes to patient: Anxiety         CONTINUE taking these medications    lamoTRIgine 150 MG tablet  Commonly known as: LAMICTAL  Notes to patient: Treat bipolar problems      methylphenidate 60 MG extended release capsule  Commonly known as: METADATE CD  Notes to patient: Attention deficit problems      sertraline 100 MG tablet  Commonly known as: ZOLOFT  Notes to patient: Mood stabilizer      traZODone 100 MG tablet  Commonly known as: DESYREL  Notes to patient: Sleep            Where to Get Your Medications      These medications were sent to [address] [facility], [address] [address]  [address] [place] [address]    Phone: [phone]   · hydrOXYzine 50 MG tablet         Follow Up Appointment: [address] [facility]  [address] [facility] [address]    You will be contacted at [phone] with your followup appointment

## 2022-04-23 ENCOUNTER — OFFICE VISIT (OUTPATIENT)
Dept: FAMILY MEDICINE CLINIC | Age: 32
End: 2022-04-23
Payer: MEDICARE

## 2022-04-23 ENCOUNTER — HOSPITAL ENCOUNTER (OUTPATIENT)
Age: 32
Discharge: HOME | End: 2022-04-25
Payer: MEDICARE

## 2022-04-23 ENCOUNTER — HOSPITAL ENCOUNTER (OUTPATIENT)
Dept: GENERAL RADIOLOGY | Age: 32
Discharge: HOME | End: 2022-04-25
Payer: MEDICARE

## 2022-04-23 VITALS
TEMPERATURE: 98.1 F | DIASTOLIC BLOOD PRESSURE: 84 MMHG | SYSTOLIC BLOOD PRESSURE: 123 MMHG | HEART RATE: 120 BPM | OXYGEN SATURATION: 99 %

## 2022-04-23 DIAGNOSIS — M54.41 ACUTE RIGHT-SIDED LOW BACK PAIN WITH RIGHT-SIDED SCIATICA: Primary | ICD-10-CM

## 2022-04-23 DIAGNOSIS — M54.41 ACUTE RIGHT-SIDED LOW BACK PAIN WITH RIGHT-SIDED SCIATICA: ICD-10-CM

## 2022-04-23 PROCEDURE — 99213 OFFICE O/P EST LOW 20 MIN: CPT | Performed by: NURSE PRACTITIONER

## 2022-04-23 PROCEDURE — 72100 X-RAY EXAM L-S SPINE 2/3 VWS: CPT

## 2022-04-23 PROCEDURE — G8427 DOCREV CUR MEDS BY ELIG CLIN: HCPCS | Performed by: NURSE PRACTITIONER

## 2022-04-23 PROCEDURE — 1036F TOBACCO NON-USER: CPT | Performed by: NURSE PRACTITIONER

## 2022-04-23 PROCEDURE — G8419 CALC BMI OUT NRM PARAM NOF/U: HCPCS | Performed by: NURSE PRACTITIONER

## 2022-04-23 RX ORDER — CYCLOBENZAPRINE HCL 10 MG
TABLET ORAL
COMMUNITY
Start: 2022-01-16

## 2022-04-23 RX ORDER — LIDOCAINE 50 MG/G
PATCH TOPICAL
COMMUNITY
Start: 2022-01-16

## 2022-04-23 RX ORDER — IBUPROFEN 800 MG/1
TABLET ORAL
COMMUNITY
Start: 2022-01-16

## 2022-04-23 RX ORDER — CETIRIZINE HYDROCHLORIDE 10 MG/1
10 TABLET ORAL DAILY
COMMUNITY

## 2022-04-23 RX ORDER — TRIAMCINOLONE ACETONIDE 40 MG/ML
40 INJECTION, SUSPENSION INTRA-ARTICULAR; INTRAMUSCULAR ONCE
Status: COMPLETED | OUTPATIENT
Start: 2022-04-23 | End: 2022-04-23

## 2022-04-23 RX ORDER — PREDNISONE 20 MG/1
40 TABLET ORAL DAILY
Qty: 10 TABLET | Refills: 0 | Status: SHIPPED | OUTPATIENT
Start: 2022-04-23 | End: 2022-04-28

## 2022-04-23 RX ADMIN — TRIAMCINOLONE ACETONIDE 40 MG: 40 INJECTION, SUSPENSION INTRA-ARTICULAR; INTRAMUSCULAR at 11:33

## 2022-04-23 ASSESSMENT — ENCOUNTER SYMPTOMS
EYE DISCHARGE: 0
SHORTNESS OF BREATH: 0
CHEST TIGHTNESS: 0
BACK PAIN: 1
ALLERGIC/IMMUNOLOGIC NEGATIVE: 1
VOMITING: 0
EYE ITCHING: 0
NAUSEA: 0
EYES NEGATIVE: 1
COUGH: 0
SORE THROAT: 0
DIARRHEA: 0
ABDOMINAL PAIN: 0

## 2022-04-23 ASSESSMENT — PATIENT HEALTH QUESTIONNAIRE - PHQ9
SUM OF ALL RESPONSES TO PHQ QUESTIONS 1-9: 0
1. LITTLE INTEREST OR PLEASURE IN DOING THINGS: 0
SUM OF ALL RESPONSES TO PHQ QUESTIONS 1-9: 0
SUM OF ALL RESPONSES TO PHQ9 QUESTIONS 1 & 2: 0
SUM OF ALL RESPONSES TO PHQ QUESTIONS 1-9: 0
2. FEELING DOWN, DEPRESSED OR HOPELESS: 0
SUM OF ALL RESPONSES TO PHQ QUESTIONS 1-9: 0

## 2022-04-23 NOTE — PATIENT INSTRUCTIONS
Patient Education      Patient Education        Sciatica: Exercises  Introduction  Here are some examples of typical rehabilitation exercises for your condition. Start each exercise slowly. Ease off the exercise if you start to have pain. Your doctor or physical therapist will tell you when you can start theseexercises and which ones will work best for you. When you are not being active, find a comfortable position for rest. Some people are comfortable on the floor or a medium-firm bed with a small pillow under their head and another under their knees. Some people prefer to lie on their side with a pillow between their knees. Don't stay in one position fortoo long. Take short walks (10 to 20 minutes) every 2 to 3 hours. Avoid slopes, hills, and stairs until you feel better. Walk only distances you can manage withoutpain, especially leg pain. How to do the exercises  Back stretches    1. Get down on your hands and knees on the floor. 2. Relax your head and allow it to droop. Round your back up toward the ceiling until you feel a nice stretch in your upper, middle, and lower back. Hold this stretch for as long as it feels comfortable, or about 15 to 30 seconds. 3. Return to the starting position with a flat back while you are on your hands and knees. 4. Let your back sway by pressing your stomach toward the floor. Lift your buttocks toward the ceiling. 5. Hold this position for 15 to 30 seconds. 6. Repeat 2 to 4 times. Follow-up care is a key part of your treatment and safety. Be sure to make and go to all appointments, and call your doctor if you are having problems. It's also a good idea to know your test results and keep alist of the medicines you take. Where can you learn more? Go to [email or web address]://jose [email or web address] org and sign in to your [employer]Stay account. Enter Q224 in the [employer] box to learn more about \"Sciatica: Exercises. \"     If you do not have an account, please click on the \"Sign Up Now\" link. Current as of: July 1, 2021               Content Version: 13.2  © 2006-2022 Healthwise, [employer]. Care instructions adapted under license by [facility] ([facility]). If you have questions about a medical condition or this instruction, always ask your healthcare professional. [address] any warranty or liability for your use of this information. Low Back Pain: Exercises  Introduction  Here are some examples of exercises for you to try. The exercises may be suggested for a condition or for rehabilitation. Start each exercise slowly. Ease off the exercises if you start to have pain. You will be told when to start these exercises and which ones will work bestfor you. How to do the exercises  Press-up    1. Lie on your stomach, supporting your body with your forearms. 2. Press your elbows down into the floor to raise your upper back. As you do this, relax your stomach muscles and allow your back to arch without using your back muscles. As your press up, do not let your hips or pelvis come off the floor. 3. Hold for 15 to 30 seconds, then relax. 4. Repeat 2 to 4 times. Alternate arm and leg (bird dog) exercise    Do this exercise slowly. Try to keep your body straight at all times, and donot let one hip drop lower than the other. 1. Start on the floor, on your hands and knees. 2. Tighten your belly muscles. 3. Raise one leg off the floor, and hold it straight out behind you. Be careful not to let your hip drop down, because that will twist your trunk. 4. Hold for about 6 seconds, then lower your leg and switch to the other leg. 5. Repeat 8 to 12 times on each leg. 6. Over time, work up to holding for 10 to 30 seconds each time. 7. If you feel stable and secure with your leg raised, try raising the opposite arm straight out in front of you at the same time. Knee-to-chest exercise    1.  Lie on your back with your knees bent and your feet flat on the floor.  2. Bring one knee to your chest, keeping the other foot flat on the floor (or keeping the other leg straight, whichever feels better on your lower back). 3. Keep your lower back pressed to the floor. Hold for at least 15 to 30 seconds. 4. Relax, and lower the knee to the starting position. 5. Repeat with the other leg. Repeat 2 to 4 times with each leg. 6. To get more stretch, put your other leg flat on the floor while pulling your knee to your chest.  Curl-ups    1. Lie on the floor on your back with your knees bent at a 90-degree angle. Your feet should be flat on the floor, about 12 inches from your buttocks. 2. Cross your arms over your chest. If this bothers your neck, try putting your hands behind your neck (not your head), with your elbows spread apart. 3. Slowly tighten your belly muscles and raise your shoulder blades off the floor. 4. Keep your head in line with your body, and do not press your chin to your chest.  5. Hold this position for 1 or 2 seconds, then slowly lower yourself back down to the floor. 6. Repeat 8 to 12 times. Pelvic tilt exercise    1. Lie on your back with your knees bent. 2. \"Brace\" your stomach. This means to tighten your muscles by pulling in and imagining your belly button moving toward your spine. You should feel like your back is pressing to the floor and your hips and pelvis are rocking back. 3. Hold for about 6 seconds while you breathe smoothly. 4. Repeat 8 to 12 times. Heel dig bridging    1. Lie on your back with both knees bent and your ankles bent so that only your heels are digging into the floor. Your knees should be bent about 90 degrees. 2. Then push your heels into the floor, squeeze your buttocks, and lift your hips off the floor until your shoulders, hips, and knees are all in a straight line.   3. Hold for about 6 seconds as you continue to breathe normally, and then slowly lower your hips back down to the floor and rest for up to 10 seconds. 4. Do 8 to 12 repetitions. Hamstring stretch in doorway    1. Lie on your back in a doorway, with one leg through the open door. 2. Slide your leg up the wall to straighten your knee. You should feel a gentle stretch down the back of your leg. 3. Hold the stretch for at least 15 to 30 seconds. Do not arch your back, point your toes, or bend either knee. Keep one heel touching the floor and the other heel touching the wall. 4. Repeat with your other leg. 5. Do 2 to 4 times for each leg. Hip flexor stretch    1. Kneel on the floor with one knee bent and one leg behind you. Place your forward knee over your foot. Keep your other knee touching the floor. 2. Slowly push your hips forward until you feel a stretch in the upper thigh of your rear leg. 3. Hold the stretch for at least 15 to 30 seconds. Repeat with your other leg. 4. Do 2 to 4 times on each side. Wall sit    1. Stand with your back 10 to 12 inches away from a wall. 2. Lean into the wall until your back is flat against it. 3. Slowly slide down until your knees are slightly bent, pressing your lower back into the wall. 4. Hold for about 6 seconds, then slide back up the wall. 5. Repeat 8 to 12 times. Follow-up care is a key part of your treatment and safety. Be sure to make and go to all appointments, and call your doctor if you are having problems. It's also a good idea to know your test results and keep alist of the medicines you take. Where can you learn more? Go to [email or web address]."[email or web address]. org and sign in to your [employer]at account. Enter J495 in the [facility] box to learn more about \"Low Back Pain: Exercises. \"     If you do not have an account, please click on the \"Sign Up Now\" link. Current as of: July 1, 2021               Content Version: 13.2  © 6488-4737 Healthwise, Incorporated. Care instructions adapted under license by [facility] [address]  If you have questions about a medical condition or this instruction, always ask your healthcare professional. [place] [address] any warranty or liability for your use of this information.

## 2022-04-23 NOTE — PROGRESS NOTES
[address] [address]  Dept: [phone]  Dept Fax: [phone]    José Roman is a 36 y.o. female who presents today forher medical conditions/complaints as noted below. José Roman is c/o of   Chief Complaint   Patient presents with   Zannie Cowden Other     onset Tuesday, possible pinched nerve on her right side     HPI:     Back Pain  This is a new problem. The current episode started in the past 7 days (x's 4 days ). The problem occurs constantly. The problem has been waxing and waning since onset. The pain is present in the lumbar spine. The quality of the pain is described as aching and stabbing. Pertinent negatives include no abdominal pain, chest pain, dysuria, fever, headaches or weakness. She has tried NSAIDs and muscle relaxant (Flexeril PRN ) for the symptoms. The treatment provided mild relief. This is a recurring issue. Last sciatic flare-up was in January. At that time was RX muscle relaxer and Lidocaine patches- minimal effectiveness. In office today, due to sciatic nerve pain. Pain developed x's 4 days. Denies any known source of injury/trauma. Denies any numbness or tingling, no involuntary BMs or urinary retention present. History reviewed. No pertinent past medical history. History reviewed. No pertinent surgical history. History reviewed. No pertinent family history.     Social History     Tobacco Use    Smoking status: Never Smoker    Smokeless tobacco: Never Used   Substance Use Topics    Alcohol use: Never      Current Outpatient Medications   Medication Sig Dispense Refill    Multiple Vitamin (MULTIVITAMIN ADULT PO) Take 1 tablet by mouth daily      lidocaine (LIDODERM) 5 % APPLY 1 PATCH ON SKIN ONCE A DAY AS NEEDED (12 HOURS ON AND 12 HOURS OFF)      ibuprofen (ADVIL;MOTRIN) 800 MG tablet TAKE 1 TABLET BY MOUTH THREE TIMES A DAY WITH FOOD AS NEEDED FOR PAIN FOR FEVER      cyclobenzaprine (FLEXERIL) 10 MG tablet TAKE 1 TABLET BY MOUTH THREE TIMES A DAY AS NEEDED FOR MUSCLE SPASM      Cholecalciferol 50 MCG (2000 UT) CAPS Take 2,000 Units by mouth daily      predniSONE (DELTASONE) 20 MG tablet Take 2 tablets by mouth daily for 5 days 10 tablet 0    sertraline (ZOLOFT) 100 MG tablet Take 100 mg by mouth nightly      traZODone (DESYREL) 100 MG tablet Take 100 mg by mouth nightly as needed for Sleep      lamoTRIgine (LAMICTAL) 150 MG tablet Take 150 mg by mouth daily      methylphenidate (METADATE CD) 60 MG extended release capsule Take 60 mg by mouth every morning.  cetirizine (ZYRTEC) 10 MG tablet Take 10 mg by mouth daily (Patient not taking: Reported on 4/23/2022)       No current facility-administered medications for this visit. No Known Allergies    Subjective:      Review of Systems   Constitutional: Negative for appetite change, chills, fatigue and fever. HENT: Negative for congestion, postnasal drip and sore throat. Eyes: Negative. Negative for discharge and itching. Respiratory: Negative for cough, chest tightness and shortness of breath. Cardiovascular: Negative for chest pain, palpitations and leg swelling. Gastrointestinal: Negative for abdominal pain, diarrhea, nausea and vomiting. Endocrine: Negative. Genitourinary: Negative for dysuria, frequency and urgency. Musculoskeletal: Positive for back pain. Negative for neck pain and neck stiffness. R upper, posterior aspect of hip/ buttock- R gluteal fold    Skin: Negative for rash. Allergic/Immunologic: Negative. Neurological: Negative for dizziness, weakness, light-headedness and headaches. Denies any numbness or tingling    Hematological: Negative for adenopathy. Psychiatric/Behavioral: Negative for confusion. Objective:      Physical Exam  Vitals reviewed. Constitutional:       Appearance: She is well-developed.    HENT:      Head: Normocephalic. Right Ear: External ear normal.      Left Ear: External ear normal.      Nose: Nose normal.   Eyes:      Conjunctiva/sclera: Conjunctivae normal.      Pupils: Pupils are equal, round, and reactive to light. Cardiovascular:      Rate and Rhythm: Normal rate and regular rhythm. Heart sounds: Normal heart sounds. No murmur heard. No friction rub. No gallop. Pulmonary:      Effort: Pulmonary effort is normal. No respiratory distress. Breath sounds: Normal breath sounds. Abdominal:      General: Bowel sounds are normal.      Palpations: Abdomen is soft. Musculoskeletal:      Cervical back: Normal range of motion and neck supple. Lumbar back: Spasms and tenderness present. Positive right straight leg raise test.   Lymphadenopathy:      Cervical: No cervical adenopathy. Skin:     General: Skin is warm and dry. Findings: No rash. Neurological:      Mental Status: She is alert and oriented to person, place, and time. Cranial Nerves: No cranial nerve deficit. Coordination: Coordination normal.      Deep Tendon Reflexes: Reflexes are normal and symmetric. Psychiatric:         Thought Content: Thought content normal.       /84 (Site: Left Upper Arm, Position: Sitting, Cuff Size: Medium Adult)   Pulse 120   Temp 98.1 °F (36.7 °C) (Infrared)   SpO2 99%     Assessment:       Diagnosis Orders   1. Acute right-sided low back pain with right-sided sciatica  XR LUMBAR SPINE (2-3 VIEWS)           Plan:     1.) Kenalog IM injection given in office today   2.) Start Prednisone   3.) Continue already RX Flexeril as prescribed   4.) Motrin PRN   5.) Lower back/Sciatic exercises provided to patient   6.) Due to recurrent issues- imaging recommended   7.) Lumbar Xray ordered- will call with results and TX accordingly   8.) RTO PRN   9.) Follow-up PCP     Problem List     None         Patient given educationalmaterials - see patient instructions.   Discussed use, benefit, and side effectsof prescribed medications. All patient questions answered. Pt verbalized understanding. Instructed to continue current medications, diet and exercise. Patient agreedwith treatment plan. Follow up as directed.      Electronically signed by LORI Harrington CNP on 4/23/2022 at 11:56 AM

## 2022-04-25 DIAGNOSIS — S32.029A CLOSED FRACTURE OF SECOND LUMBAR VERTEBRA, UNSPECIFIED FRACTURE MORPHOLOGY, INITIAL ENCOUNTER (HCC): Primary | ICD-10-CM

## 2022-05-03 ENCOUNTER — OFFICE VISIT (OUTPATIENT)
Dept: ORTHOPEDIC SURGERY | Age: 32
End: 2022-05-03
Payer: MEDICARE

## 2022-05-03 VITALS — BODY MASS INDEX: 29.44 KG/M2 | RESPIRATION RATE: 16 BRPM | HEIGHT: 62 IN | WEIGHT: 160 LBS

## 2022-05-03 DIAGNOSIS — M54.41 RIGHT-SIDED LOW BACK PAIN WITH RIGHT-SIDED SCIATICA, UNSPECIFIED CHRONICITY: ICD-10-CM

## 2022-05-03 DIAGNOSIS — M41.25 OTHER IDIOPATHIC SCOLIOSIS, THORACOLUMBAR REGION: Primary | ICD-10-CM

## 2022-05-03 PROCEDURE — G8419 CALC BMI OUT NRM PARAM NOF/U: HCPCS | Performed by: PHYSICIAN ASSISTANT

## 2022-05-03 PROCEDURE — 99203 OFFICE O/P NEW LOW 30 MIN: CPT | Performed by: PHYSICIAN ASSISTANT

## 2022-05-03 PROCEDURE — G8427 DOCREV CUR MEDS BY ELIG CLIN: HCPCS | Performed by: PHYSICIAN ASSISTANT

## 2022-05-03 PROCEDURE — 1036F TOBACCO NON-USER: CPT | Performed by: PHYSICIAN ASSISTANT

## 2022-05-03 RX ORDER — CYCLOBENZAPRINE HCL 10 MG
10 TABLET ORAL NIGHTLY PRN
Qty: 20 TABLET | Refills: 0 | Status: SHIPPED | OUTPATIENT
Start: 2022-05-03 | End: 2022-05-13

## 2022-05-03 RX ORDER — MELOXICAM 15 MG/1
15 TABLET ORAL DAILY
Qty: 30 TABLET | Refills: 0 | Status: SHIPPED | OUTPATIENT
Start: 2022-05-03

## 2022-05-03 NOTE — PROGRESS NOTES
[place], [address] [place] [address], [address] [facility], [address] [facility]           Dept Phone: [phone]           Dept Fax:  [phone]            Patient ID: Jairo Prado is a 36 y.o. female    Chief Compliant:  Chief Complaint   Patient presents with    Back Pain        HPI:  This is a 36 y.o. female who presents to the clinic today for evaluation of lower back pain. Pain started:  Acute on chronic pain, hx of scoliosis. 2 weeks ago, she fell against something and injured back. Went to urgent care and was found to have possible compression fx. Sciatic symptoms?: right    Previous treatment:  none. Review of Systems     Denies chest pain  Denies n/v/d/c and abdominal pain  Denies fevers/chills  C/o back pain    All other systems reviewed and are negative.       Past History:    Current Outpatient Medications:     cyclobenzaprine (FLEXERIL) 10 MG tablet, Take 1 tablet by mouth nightly as needed for Muscle spasms, Disp: 20 tablet, Rfl: 0    meloxicam (MOBIC) 15 MG tablet, Take 1 tablet by mouth daily, Disp: 30 tablet, Rfl: 0    Multiple Vitamin (MULTIVITAMIN ADULT PO), Take 1 tablet by mouth daily, Disp: , Rfl:     lidocaine (LIDODERM) 5 %, APPLY 1 PATCH ON SKIN ONCE A DAY AS NEEDED (12 HOURS ON AND 12 HOURS OFF), Disp: , Rfl:     ibuprofen (ADVIL;MOTRIN) 800 MG tablet, TAKE 1 TABLET BY MOUTH THREE TIMES A DAY WITH FOOD AS NEEDED FOR PAIN FOR FEVER, Disp: , Rfl:     cyclobenzaprine (FLEXERIL) 10 MG tablet, TAKE 1 TABLET BY MOUTH THREE TIMES A DAY AS NEEDED FOR MUSCLE SPASM, Disp: , Rfl:     Cholecalciferol 50 MCG (2000 UT) CAPS, Take 2,000 Units by mouth daily, Disp: , Rfl:     cetirizine (ZYRTEC) 10 MG tablet, Take 10 mg by mouth daily (Patient not taking: Reported on 4/23/2022), Disp: , Rfl:     sertraline (ZOLOFT) 100 MG tablet, Take 100 mg by mouth nightly, Disp: , Rfl:     traZODone (DESYREL) 100 MG tablet, Take 100 mg by mouth nightly as needed for Sleep, Disp: , Rfl:     lamoTRIgine (LAMICTAL) 150 MG tablet, Take 150 mg by mouth daily, Disp: , Rfl:     methylphenidate (METADATE CD) 60 MG extended release capsule, Take 60 mg by mouth every morning., Disp: , Rfl:   No Known Allergies  Social History     Socioeconomic History    Marital status: Single     Spouse name: Not on file    Number of children: Not on file    Years of education: Not on file    Highest education level: Not on file   Occupational History    Not on file   Tobacco Use    Smoking status: Never Smoker    Smokeless tobacco: Never Used   Vaping Use    Vaping Use: Never used   Substance and Sexual Activity    Alcohol use: Never    Drug use: Never    Sexual activity: Not on file   Other Topics Concern    Not on file   Social History Narrative    Not on file     Social Determinants of Health     Financial Resource Strain:     Difficulty of Paying Living Expenses: Not on file   Food Insecurity:     Worried About Running Out of Food in the Last Year: Not on file    Arlene of Food in the Last Year: Not on file   Transportation Needs:     Lack of Transportation (Medical): Not on file    Lack of Transportation (Non-Medical):  Not on file   Physical Activity:     Days of Exercise per Week: Not on file    Minutes of Exercise per Session: Not on file   Stress:     Feeling of Stress : Not on file   Social Connections:     Frequency of Communication with Friends and Family: Not on file    Frequency of Social Gatherings with Friends and Family: Not on file    Attends Mormonism Services: Not on file    Active Member of Clubs or Organizations: Not on file    Attends Club or Organization Meetings: Not on file    Marital Status: Not on file   Intimate Partner Violence:     Fear of Current or Ex-Partner: Not on file    Emotionally Abused: Not on file    Physically Abused: Not on file    Sexually Abused: Not on file   Housing Stability:     Unable to Pay for Housing in the Last Year: Not on file    Number of Places Lived in the Last Year: Not on file    Unstable Housing in the Last Year: Not on file     No past medical history on file. No past surgical history on file. No family history on file. Physical Exam:  Vitals signs and nursing note reviewed. Appearance: well-developed, afebrile  Head: Normocephalic and atraumatic. Nose: Nose normal.   Conjunctiva/sclera: Conjunctivae normal.        Musculoskeletal:        Lumbar Spine:  Gait: normal  Tenderness: right lower back    Edema: none  Back ROM:   Flexion: slightly deminished (scoliosis)   Extension: painful  Lateral Rotation: normal  Lateral Bending: normal    Sensation: normal    BAIRON: neg  Straight leg raise: right + at 90  SI joint tenderness: none    Motor:  L quadriceps 5/5; R quadriceps 5/5  L Dorsiflexion 5/5; R dorsiflexion 5/5  L Plantarflexion 5/5; R plantarflexion 5/5        Lungs: effort is normal. No respiratory distress. Skin: warm and dry. Mental Status: Alert and oriented to person, place, and time. Sensory: No sensory deficit. Behavior: Behavior normal.      Thought Content: Thought content normal.        Diagnostic imaging:  XR LUMBAR SPINE (2-3 VIEWS)    Result Date: 4/23/2022  EXAMINATION: THREE XRAY VIEWS OF THE LUMBAR SPINE 4/23/2022 11:57 am COMPARISON: None. HISTORY: ORDERING SYSTEM PROVIDED HISTORY: Acute right-sided low back pain with right-sided sciatica TECHNOLOGIST PROVIDED HISTORY: back pain Reason for Exam: Chronic lower back pain, worsening recently, NKI FINDINGS: Dextrocurvature of the lumbar spine is seen centered at the level of L3 with Gutierrez angle measuring 21 degrees. Multilevel mild-to-moderate spondylosis is seen. Multilevel mild facet degenerative hypertrophy is identified. Prominent Schmorl's node is seen involving the superior endplate at the L2 level plus or minus compression fracture.  Paravertebral soft tissues are unremarkable     1. Lumbar spine dextroscoliosis, as discussed above. 2. L2 vertebral body superior endplate prominent Schmorl's node plus or minus compression fracture of indeterminate age. For clinical suspicion of acute compression fracture, recommend MRI lumbar spine examination for further evaluation. 3. Multilevel lumbar mild to moderate spondylosis. 4. Multilevel mild facet degenerative arthropathy. Assessment and Plan:  Pt instructed to call office for any reason if symptoms change. Also instructed if symptoms worsen in anyway to go to nearest ER for evaluation. 1. Other idiopathic scoliosis, thoracolumbar region    2. Right-sided low back pain with right-sided sciatica, unspecified chronicity            MRI stat to r/o acute compression fx  PT-aqua   Flexeril  Mobic. F/u after MRI or 6 weeks. Note for work given (2 weeks off)        Provider Attestation:  Fiona Ervin, personally performed the services described in this documentation. All medical record entries made by the scribe were at my direction and in my presence. I have reviewed the chart and discharge instructions and agree that the records reflect my personal performance and is accurate and complete. Nathan Perez PA-C 5/3/22     Please note that this chart was generated using voice recognition Dragon dictation software. Although every effort was made to ensure the accuracy of this automated transcription, some errors in transcription may have occurred.

## 2022-05-03 NOTE — LETTER
[address] [address]  Phone: [phone]  Fax: [phone] [place], [place]        May 3, 2022     Patient: Sandip Singletary   YOB: 1990   Date of Visit: 5/3/2022       To Whom It May Concern: It is my medical opinion that Sandip Singletary should remain out of work until for two weeks,5/17/22. If you have any questions or concerns, please don't hesitate to call.     Sincerely,        HUMBERTO Young

## 2022-05-05 ENCOUNTER — HOSPITAL ENCOUNTER (OUTPATIENT)
Dept: MRI IMAGING | Age: 32
Discharge: HOME | End: 2022-05-07
Payer: MEDICARE

## 2022-05-05 DIAGNOSIS — M41.25 OTHER IDIOPATHIC SCOLIOSIS, THORACOLUMBAR REGION: ICD-10-CM

## 2022-05-05 DIAGNOSIS — M54.41 RIGHT-SIDED LOW BACK PAIN WITH RIGHT-SIDED SCIATICA, UNSPECIFIED CHRONICITY: ICD-10-CM

## 2022-05-05 PROCEDURE — 72148 MRI LUMBAR SPINE W/O DYE: CPT

## 2022-05-16 ENCOUNTER — HOSPITAL ENCOUNTER (OUTPATIENT)
Dept: PHYSICAL THERAPY | Facility: CLINIC | Age: 32
Setting detail: THERAPIES SERIES
Discharge: HOME | End: 2022-05-16
Payer: MEDICARE

## 2022-05-16 PROCEDURE — 97161 PT EVAL LOW COMPLEX 20 MIN: CPT

## 2022-05-16 PROCEDURE — 97113 AQUATIC THERAPY/EXERCISES: CPT

## 2022-05-16 NOTE — CONSULTS
Abigail  [address]  Phone: [phone]  Fax: [phone]       Physical Therapy Spine Evaluation    Date:  2022  Patient: Ken Mike  : 1990  MRN: [id number]  Physician: HUMBERTO Lopez   Insurance: [employer] [employer]age ( Visits Approved, Auth after )  Medical Diagnosis: M41.25 - Other idiopathic scoliosis, thoracolumbar region, M54.41 - Right-sided low back pain with right-sided sciatica, unspecified chronicity  Rehab Codes: M54.5  Onset Date: 22   Next 's appt. : --      Subjective:   CC/HPI: Pt reports to PT with LB pain. Per pt, she hit her back on an empty cart at work and kept working, however the next day she had increased pain which led her to call off work and go to urgent care. She also noticed that she started to have sciatic pain down her R leg. Currently, pt reports that she is still limited with her ability to walk longer distances d/t pain, as well as difficulties with sleeping, lifting heavy objects, or sitting. Pt reports that she is currently experiencing shooting pain down her R LE. Pt reports that she is here for aquatic therapy. PMHx:   [] Unremarkable               [x] Refer to full medical chart  In EPIC     Tests: [x] X-Ray:   Impression   1. Lumbar spine dextroscoliosis, as discussed above. 2. L2 vertebral body superior endplate prominent Schmorl's node plus or minus   compression fracture of indeterminate age.  For clinical suspicion of acute   compression fracture, recommend MRI lumbar spine examination for further   evaluation. 3. Multilevel lumbar mild to moderate spondylosis. 4. Multilevel mild facet degenerative arthropathy. [x] MRI:  Impression   1. Multilevel Schmorl's nodes corresponding to abnormality noted on recent   radiographs. 2. Lumbar dextroscoliosis without spondylolisthesis.    3. Left posterior T12-L1 disc protrusion causes moderate spinal canal stenosis and severe left lateral recess narrowing compressing the traversing   left L1 nerve root. 4. Mild spinal canal stenosis and right lateral recess narrowing at L1-2.   5. Severe right L4-5 lateral recess narrowing with compression of the   traversing right L5 nerve root. 6. Mild right L1 neural foraminal narrowing. [] Other:    Comorbidities:   [] Obesity [] Dialysis  [] N/A   [] Asthma/COPD [] Dementia [x] Other: Depression   [] Stroke [] Sleep apnea [] Other:   [] Vascular disease [] Rheumatic disease [] Other:       Medications: [x] Refer to full medical record [] None [] Other:  Allergies:      [] Refer to full medical record [x] None [] Other:    ADL/IADL [x] Previously independent with all [x] Currently independent with all Who currently assists the patient with task     [] Previously independent with all except: [] Currently independent with all except:     Bathing  [] Assist [] Assist     Dress/grooming [] Assist [] Assist     Transfer/mobility [] Assist [] Assist     Feeding [] Assist [] Assist     Toileting [] Assist [] Assist     Driving [] Assist [] Assist     Housekeeping [] Assist [] Assist     Grocery shop/meal prep [] Assist [] Assist        Gait Prior level of function Current level of function    [x] Independent  [] Assist [x] Independent  [] Assist   Device: [x] Independent [x] Independent    [] Straight Cane [] Quad cane [] Straight Cane [] Quad cane    [] Standard walker [] Rolling walker   [] 4 wheeled walker [] Standard walker [] Rolling walker   [] 4 wheeled walker    [] Wheelchair [] Wheelchair       Function:  Hand Dominance  [] Right  [] Left  Marital Status    Home type Mobile home   Stairs from outside 4 JESSENIA   Stairs inside --   Employment [employer]   Job status Currently off, returning to work tomorrow   Work Activities/duties  Walking, standing, lifting   Recreational Activities --         Pain present?  Yes   Location LB   Pain Rating currently 4/10   Pain at worse 9/10   Pain at best 2/10   Description of pain Constant, sharp, stabbing, aching   Altered Sensation Denies   What makes it worse Positions   What makes it better Pain patches and soak baths   Symptom progression Gotten better   Sleep Sleep still affected           Objective:   STRENGTH    Left Right   L1-2 Hip Flex 5/5 4-/5, pain   Hip Abd     L3-4 Knee Ext 4/5 4-/5, pain   L4 Ankle DF 4/5 4-/5, pain   L5 EHL     S1 Plant. Flex 4/5 4-/5, pain   Abdominals 3+/5    Erector Spinae     PPT from 90 to=     Knee flexion 4/5 4-/5, pain                                       Lumbar ROM Left Right   Flexion Limited 50%, pain     Extension      Rotation  Limited 75%, pain Limited 75%, pain   Sidebend      UE/LE                                  TESTS (+/-) LEFT RIGHT Not Tested   SLR supine   [x]   Hamstring (SLR)   [x]   SKTC   [x]   DKTC   [x]   Slump/Dural - -, reports feels the same as the L side []   SI JT   [x]   BAIRON   [x]   Joint Mobility   [x]   Cerv. Comp   [x]   Cerv. Distraction   [x]   Cerv.  Alar/Transverse   [x]   Vertebral Artery   [x]   Adsons   [x]   [place]   [x]       OBSERVATION No Deficit Deficit Not Tested Comments   Posture       Forward Head [] [x] []    Rounded Shoulders [] [x] []    Kyphosis [] [] []    Lordosis [] [] []    Lateral Shift [] [] []    Scoliosis [] [x] []    Iliac Crest [] [] []    PSIS [] [] []    ASIS [] [] []    Genu Valgus [] [] []    Genu Varus [] [] []    Genu Recurvatum [] [] []    Pronation [] [] []    Supination [] [] []    Leg Length Discrp [] [] []    Slumped sitting [] [] []    Palpation [] [x] [] Pt reports tenderness throughout R LB, R gluteal region diffusely   Sensation [] [x] [] Denies any numbness/tingling, but reports pain down R LE   Edema [] [] [x]    Neurological [] [] [x]          Flexibility Normal Left tight Right tight   Hamstring [] [x] [x]   Hip flexor [] [x] [x]   Quad [] [x] [x]   Piriformis [] [x] [x]   Gastroc/Soleus [] [x] [x]         - Pt reports too much pain laying down, so requests to avoid testing in these positions, so looking to work on general flexibility    FUNCTION Normal Difficult Unable   Sitting [] [x] []   Standing [] [x] []   Ambulation [] [x] []   Groom/Dress [x] [] []   Lift/Carry [] [] [x]   Stairs [x] [] []   Bending [] [x] []   OH reach [] [x] []   Sit to Stand [] [x] []       Functional Test: Modified Oswestry Score: 36% functionally impaired                                          Lees Fall Risk Assessment    Patient Name:  Juliana Woods  : 1990    Risk Factor Scale  Score   History of Falls [x] Yes  [] No 25  0 25   Secondary Diagnosis [x] Yes  [] No 15  0 15   Ambulatory Aid [] Furniture  [] Crutches/cane/walker  [x] None/bedrest/wheelchair/nurse 30  15  0 0   IV/Heparin Lock [] Yes  [x] No 20  0 0   Gait/Transferring [] Impaired  [x] Weak  [] Normal/bedrest/immobile 20  10  0 10   Mental Status [] Forgets limitations  [x] Oriented to own ability 15  0 0      Total: 50     Based on the Assessment score: check the appropriate box. []  No intervention needed   Low =   Score of 0-24    []  Use standard prevention interventions Moderate =  Score of 24-44   [] Give patient handout and discuss fall prevention strategies   [] Establish goal of education for patient/family RE: fall prevention strategies    [x]  Use high risk prevention interventions High = Score of 45 and higher   [x] Give patient handout and discuss fall prevention strategies   [x] Establish goal of education for patient/family Re: fall prevention strategies   [x] Discuss lifeline / other resources    Electronically signed by: Shawn Bey PT  Date: 2022          Assessment:  Patient would benefit from skilled physical therapy services in order to: Improve lumbar ROM, improve daniel LE/lumbar flexibility, improve daniel LE/core strength, reduce pain, and ease tolerance to all walking, sitting, and lifting to ease return to work    Problems:    [x] ? Pain  [x] ? ROM  [x] ? Strength  [x] ? Function  [] Other      Goals  MET NOT MET ON-  GOING  Details   Date Addressed: NA       STG: To be met in 8 treatments           1. ? Pain: Decrease pain levels to 6/10 with ADLs/work activities to help ease all standing and lifting at work.  []  []  []      2. ? ROM: Increase flexibility in daniel LEs and LB to help improve pain-free lumbar ROM []  []  []      3. Pt will self-report better tolerance to sleeping, helping to improve sleep quality. []  []  []     4. Independent with Home Exercise Programs []  []  []     5. Demonstrate knowledge of fall risk prevention  []  []  []      []  []  []     Date Addressed: NA       LTG: To be met in 16 treatments       1. Improve score on assessment tool Modified Oswestry from 36% impairment to less than 20% impairment, demonstrating improved tolerance to activity. []  []  []     2. Reduce pain levels to 3/10 or less with ADLs/work activities, demonstrating improved tolerance to work tasks. []  []  []     3. ? Strength: Increase daniel LE/core strength to 4+/5 grossly to help reduce need for compensations with lifting at work and to help improve LE/core stability. []  []  []     4. Pt will demonstrate full lumbar flexion and rotation to help reduce need for compensations with lumbar motion. Patient goals: \"To ease my pain enough so I can work with little to no pain\"    Rehab Potential:  [x] Good  [] Fair  [] Poor   Suggested Professional Referral:  [x] No  [] Yes:  Barriers to Goal Achievement[de-identified]  [x] No  [] Yes:  Domestic Concerns:  [x] No  [] Yes:    Pt. Education:  [x] Plans/Goals, Risks/Benefits discussed  [] Home exercise program  Method of Education: [x] Verbal  [] Demo  [] Written  Comprehension of Education:  [x] Verbalizes understanding. [] Demonstrates understanding. [] Needs Review. [] Demonstrates/verbalizes understanding of HEP/Ed previously given.     Treatment Plan:  [x] Therapeutic Exercise   [] Electrical Stimulation  [x] Manual Therapy     [] Lumbar/Cervical Traction  [] Neuromuscular Re-education [x] Cold/hotpack [] Iontophoresis: 4 mg/mL  [x] Instruction in HEP             Dexamethasone Sodium  [] Gait Training           Phosphate 40-80 mAmin  [x] Vasocompression: Christina Bravo    [x] Other: Aquatic Therapy    []  Medication allergies reviewed for use of    Dexamethasone Sodium Phosphate 4mg/ml     with iontophoresis treatments. Pt is not allergic. Frequency:  2 x/week for 16 visits      Todays Treatment:  Precautions: General  Exercises:  Exercise  LB Pain Reps/ Time Weight/ Level Comments                                                                                                         Other:    Specific Instructions for next treatment: Continue tx per POC    Evaluation Complexity:  History (Personal factors, comorbidities) [] 0 [x] 1-2 [] 3+   Exam (limitations, restrictions) [x] 1-2 [] 3 [] 4+   Clinical presentation (progression) [] Stable [x] Evolving  [] Unstable   Decision Making [x] Low [] Moderate [] High    [x] Low Complexity [] Moderate Complexity [] High Complexity       Treatment Charges: Mins Units   [x] Evaluation       [x]  Low       []  Moderate       []  High 37 1   []  Modalities     []  Ther Exercise     []  Manual Therapy     []  Ther Activities     []  Aquatics     []  Vasocompression     []  Other       TOTAL TREATMENT TIME: 37    Time in: 1500       Time out: [address]    Electronically signed by: Michael Khalil PT    Physician Signature:________________________________Date:__________________  By signing above or cosigning this note, I have reviewed this plan of care and certify a need for medically necessary rehabilitation services.      *PLEASE SIGN ABOVE AND FAX BACK ALL PAGES*

## 2022-05-16 NOTE — FLOWSHEET NOTE
[x] [facility]  [address] [place] [address]  P: [phone]  F: [phone]     Physical Therapy Daily  Aquatic Treatment Note    Date:  2022  Patient Name:  Noel Bertrand    :  1990  MRN: [id number]  Physician: HUMBERTO Barber                           Insurance: [place] Advantage ( Visits Approved, Auth after )  Medical Diagnosis: M41.25 - Other idiopathic scoliosis, thoracolumbar region, M54.41 - Right-sided low back pain with right-sided sciatica, unspecified chronicity  Rehab Codes: Onset Date: 22                           Next 's appt. : --      Visit# / total visits: 1/  Cancels/No Shows:     Subjective:    Pain:  [x] Yes  [] No Location: LB Pain Rating: (0-10 scale) 4/10  Pain altered Tx:  [x] No  [] Yes  Action:  Comments: Pt reports pain cont at 4/10 after eval.  To begin aquatics for gentle ROM and strengthening.     Objective:     KEY  B = Belt G = Gloves N = Noodle   C = Cuffs K = Kickboard P = Paddles   CC = Cervical Collar L = Laps T = Theratube   DB = Dumbells M = Minutes W = Weights     Exercises/Activities  Warm-up/Amb  Dynamic Exercises    Forward 3L March 3L   Sideways 3L Squat    Backwards 3L Retro HS curls      Retro SLR    Stretches  Braiding    Gastroc/Soleus  Heel to Toe amb    Hamstring  Toe amb    Hip flexor  Heel amb    Piriformis      SKTC      Pec Stretch      Post Deltoid  Static Exercises UE      Shoulder flex/ext 10   Static Exercises LE  Shoulder abd/add 10   Heel/toe raises 10 Shoulder H.  abd/add 10    10 Shoulder IR/ER    Mini-squats 10 Rowing 10   4-way hip  10 Arm Circles    Hamstring curls 10 UT shrugs/rolls    Hip Circles/Fig 8  Scap squeezes    Ankle ROM  Diagonals 1/2    Lunges   Elbow flex/ext      Pron/Sup    Functional Exercise  Wrist AROM    Step      Wall Push-ups  Deep H20/    SLS  Bike 3m   Breast Stroke on Noodle  Hip abd/add    Noodle Twist  Hip flex/ext Noodle Push down  Hip IR/ER    Kickboard push/pull  Knee flex/ext      Push/pull on [employer] 5m   Other:    Specific Instructions for next treatment:    Treatment Charges: Mins Units   []  Modalities     []  Ther Exercise     []  Manual Therapy     []  Ther Activities     [x]  Aquatics 30 2   []  Other       Assessment: [x] Progressing toward goals. Initiated aquatic ex per flow sheet with good henna. Pt could notice reduced LB pain upon entering the pool. VC for upright posture and proper tech throughout tx. Pt felt good in deep water with min pain. Pt feels good with no c/o after aquatics. Will monitor sx and progress as pt henna. [] No change. [] Other:  STG/LTG    Pt. Education:  [x] Yes  [] No  [] Reviewed Prior HEP/Ed  Method of Education: [x] Verbal  [] Demo  [] Written  Comprehension of Education:  [x] Verbalizes understanding. [] Demonstrates understanding. [] Needs review. [] Demonstrates/verbalizes HEP/Ed previously given. Plan: [x] Continue per plan of care.    [] Other:      Time In:3:45pm            Time Out: 4:20pm    Electronically signed by:  Gina Gomez PTA

## 2022-05-20 ENCOUNTER — HOSPITAL ENCOUNTER (OUTPATIENT)
Dept: PHYSICAL THERAPY | Facility: CLINIC | Age: 32
Setting detail: THERAPIES SERIES
Discharge: HOME | End: 2022-05-20
Payer: MEDICARE

## 2022-05-20 NOTE — FLOWSHEET NOTE
[] [facility] - [facility] &  Therapy  [address]    [phone]  F: [phone]   [] [address]  [facility] [address]   [address]  P: [phone]  F: [phone]  [] [address] &  Therapy  [address]  P: [phone]  F: [phone] [] [address]  P: [phone]  F: [phone]  [] [address]  [facility]   [address]   [place]: [phone]  F: [phone]   [] [address]  [place]: [phone]   F: [phone]     Physical Therapy Cancel/No Show note    Date: 2022  Patient: Jazmin Brennan  : 1990  MRN: [id number]    Cancels/No Shows to date:     For today's appointment patient:    [x]  Cancelled    [] Rescheduled appointment    [] No-show     Reason given by patient:    [x]  Patient ill    []  Conflicting appointment    [] No transportation      [] Conflict with work    [] No reason given    [] Weather related    [] [id number]    [] Other:      Comments:        [] Next appointment was confirmed    Electronically signed by: Yuki Pringle
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
## 2024-05-17 ENCOUNTER — OFFICE VISIT (OUTPATIENT)
Dept: NEUROSURGERY | Facility: CLINIC | Age: 34
End: 2024-05-17
Payer: MEDICAID

## 2024-05-17 VITALS
DIASTOLIC BLOOD PRESSURE: 63 MMHG | TEMPERATURE: 96.4 F | WEIGHT: 156 LBS | HEART RATE: 76 BPM | BODY MASS INDEX: 29.45 KG/M2 | SYSTOLIC BLOOD PRESSURE: 108 MMHG | HEIGHT: 61 IN

## 2024-05-17 DIAGNOSIS — F32.A DEPRESSION, UNSPECIFIED DEPRESSION TYPE: ICD-10-CM

## 2024-05-17 DIAGNOSIS — G43.919 INTRACTABLE MIGRAINE WITHOUT STATUS MIGRAINOSUS, UNSPECIFIED MIGRAINE TYPE: ICD-10-CM

## 2024-05-17 PROCEDURE — 99215 OFFICE O/P EST HI 40 MIN: CPT | Performed by: NEUROLOGICAL SURGERY

## 2024-05-17 PROCEDURE — 99205 OFFICE O/P NEW HI 60 MIN: CPT | Performed by: NEUROLOGICAL SURGERY

## 2024-05-17 ASSESSMENT — PAIN SCALES - GENERAL: PAINLEVEL: 0-NO PAIN

## 2024-05-31 ENCOUNTER — OFFICE VISIT (OUTPATIENT)
Dept: PRIMARY CARE | Facility: CLINIC | Age: 34
End: 2024-05-31
Payer: MEDICAID

## 2024-05-31 VITALS
BODY MASS INDEX: 28.89 KG/M2 | DIASTOLIC BLOOD PRESSURE: 68 MMHG | TEMPERATURE: 98.2 F | WEIGHT: 157 LBS | HEART RATE: 108 BPM | HEIGHT: 62 IN | OXYGEN SATURATION: 99 % | SYSTOLIC BLOOD PRESSURE: 118 MMHG

## 2024-05-31 DIAGNOSIS — G89.29 CHRONIC RIGHT-SIDED LOW BACK PAIN WITH RIGHT-SIDED SCIATICA: ICD-10-CM

## 2024-05-31 DIAGNOSIS — F41.9 ANXIETY: ICD-10-CM

## 2024-05-31 DIAGNOSIS — F33.42 RECURRENT MAJOR DEPRESSIVE DISORDER, IN FULL REMISSION (CMS-HCC): ICD-10-CM

## 2024-05-31 DIAGNOSIS — E78.00 HYPERCHOLESTEROLEMIA: Primary | ICD-10-CM

## 2024-05-31 DIAGNOSIS — M54.41 CHRONIC RIGHT-SIDED LOW BACK PAIN WITH RIGHT-SIDED SCIATICA: ICD-10-CM

## 2024-05-31 DIAGNOSIS — R53.83 FATIGUE, UNSPECIFIED TYPE: ICD-10-CM

## 2024-05-31 DIAGNOSIS — E55.9 VITAMIN D DEFICIENCY: ICD-10-CM

## 2024-05-31 PROBLEM — F33.9 DEPRESSION, MAJOR, RECURRENT (CMS-HCC): Status: ACTIVE | Noted: 2017-10-15

## 2024-05-31 LAB
ALBUMIN SERPL BCP-MCNC: 4.2 G/DL (ref 3.4–5)
ALP SERPL-CCNC: 61 U/L (ref 33–110)
ALT SERPL W P-5'-P-CCNC: 15 U/L (ref 7–45)
ANION GAP SERPL CALC-SCNC: 14 MMOL/L (ref 10–20)
AST SERPL W P-5'-P-CCNC: 14 U/L (ref 9–39)
BASOPHILS # BLD AUTO: 0.04 X10*3/UL (ref 0–0.1)
BASOPHILS NFR BLD AUTO: 0.5 %
BILIRUB SERPL-MCNC: 0.3 MG/DL (ref 0–1.2)
BUN SERPL-MCNC: 13 MG/DL (ref 6–23)
CALCIUM SERPL-MCNC: 9.3 MG/DL (ref 8.6–10.3)
CHLORIDE SERPL-SCNC: 104 MMOL/L (ref 98–107)
CHOLEST SERPL-MCNC: 172 MG/DL (ref 0–199)
CHOLESTEROL/HDL RATIO: 3.1
CO2 SERPL-SCNC: 24 MMOL/L (ref 21–32)
CREAT SERPL-MCNC: 0.65 MG/DL (ref 0.5–1.05)
EGFRCR SERPLBLD CKD-EPI 2021: >90 ML/MIN/1.73M*2
EOSINOPHIL # BLD AUTO: 0.19 X10*3/UL (ref 0–0.7)
EOSINOPHIL NFR BLD AUTO: 2.2 %
ERYTHROCYTE [DISTWIDTH] IN BLOOD BY AUTOMATED COUNT: 12.3 % (ref 11.5–14.5)
GLUCOSE SERPL-MCNC: 92 MG/DL (ref 74–99)
HCT VFR BLD AUTO: 39.7 % (ref 36–46)
HDLC SERPL-MCNC: 55.6 MG/DL
HGB BLD-MCNC: 13.1 G/DL (ref 12–16)
IMM GRANULOCYTES # BLD AUTO: 0.01 X10*3/UL (ref 0–0.7)
IMM GRANULOCYTES NFR BLD AUTO: 0.1 % (ref 0–0.9)
LDLC SERPL CALC-MCNC: 101 MG/DL
LYMPHOCYTES # BLD AUTO: 2.9 X10*3/UL (ref 1.2–4.8)
LYMPHOCYTES NFR BLD AUTO: 33.8 %
MCH RBC QN AUTO: 28.5 PG (ref 26–34)
MCHC RBC AUTO-ENTMCNC: 33 G/DL (ref 32–36)
MCV RBC AUTO: 86 FL (ref 80–100)
MONOCYTES # BLD AUTO: 0.53 X10*3/UL (ref 0.1–1)
MONOCYTES NFR BLD AUTO: 6.2 %
NEUTROPHILS # BLD AUTO: 4.91 X10*3/UL (ref 1.2–7.7)
NEUTROPHILS NFR BLD AUTO: 57.2 %
NON HDL CHOLESTEROL: 116 MG/DL (ref 0–149)
NRBC BLD-RTO: 0 /100 WBCS (ref 0–0)
PLATELET # BLD AUTO: 433 X10*3/UL (ref 150–450)
POTASSIUM SERPL-SCNC: 4.2 MMOL/L (ref 3.5–5.3)
PROT SERPL-MCNC: 7 G/DL (ref 6.4–8.2)
RBC # BLD AUTO: 4.6 X10*6/UL (ref 4–5.2)
RBC MORPH BLD: NORMAL
SODIUM SERPL-SCNC: 138 MMOL/L (ref 136–145)
TRIGL SERPL-MCNC: 75 MG/DL (ref 0–149)
TSH SERPL-ACNC: 1.18 MIU/L (ref 0.44–3.98)
VLDL: 15 MG/DL (ref 0–40)
WBC # BLD AUTO: 8.6 X10*3/UL (ref 4.4–11.3)

## 2024-05-31 PROCEDURE — 1036F TOBACCO NON-USER: CPT | Performed by: NURSE PRACTITIONER

## 2024-05-31 PROCEDURE — 82306 VITAMIN D 25 HYDROXY: CPT

## 2024-05-31 PROCEDURE — 84443 ASSAY THYROID STIM HORMONE: CPT

## 2024-05-31 PROCEDURE — 99204 OFFICE O/P NEW MOD 45 MIN: CPT | Performed by: NURSE PRACTITIONER

## 2024-05-31 PROCEDURE — 85025 COMPLETE CBC W/AUTO DIFF WBC: CPT

## 2024-05-31 PROCEDURE — 80061 LIPID PANEL: CPT

## 2024-05-31 PROCEDURE — 80053 COMPREHEN METABOLIC PANEL: CPT

## 2024-05-31 PROCEDURE — 36415 COLL VENOUS BLD VENIPUNCTURE: CPT

## 2024-05-31 RX ORDER — TIZANIDINE 4 MG/1
4 TABLET ORAL EVERY 8 HOURS PRN
COMMUNITY
Start: 2024-05-10

## 2024-05-31 RX ORDER — SERTRALINE HYDROCHLORIDE 100 MG/1
100 TABLET, FILM COATED ORAL DAILY
COMMUNITY
Start: 2023-10-18

## 2024-05-31 RX ORDER — IBUPROFEN 600 MG/1
600 TABLET ORAL EVERY 6 HOURS PRN
COMMUNITY

## 2024-05-31 RX ORDER — SERTRALINE HYDROCHLORIDE 50 MG/1
TABLET, FILM COATED ORAL
COMMUNITY
Start: 2023-10-18

## 2024-05-31 RX ORDER — NAPROXEN 500 MG/1
500 TABLET ORAL
COMMUNITY
Start: 2024-05-10

## 2024-05-31 ASSESSMENT — ENCOUNTER SYMPTOMS
NERVOUS/ANXIOUS: 0
PHOTOPHOBIA: 0
HEADACHES: 0
EYE DISCHARGE: 0
WEAKNESS: 0
APPETITE CHANGE: 0
LOSS OF SENSATION IN FEET: 0
BRUISES/BLEEDS EASILY: 0
ABDOMINAL PAIN: 0
SINUS PAIN: 0
CONSTIPATION: 0
ACTIVITY CHANGE: 0
DEPRESSION: 1
SHORTNESS OF BREATH: 0
BLOOD IN STOOL: 0
DIZZINESS: 0
SORE THROAT: 0
ADENOPATHY: 0
TREMORS: 0
DIARRHEA: 0
JOINT SWELLING: 0
ARTHRALGIAS: 0
HEMATURIA: 0
PALPITATIONS: 0
BACK PAIN: 0
COUGH: 0
AGITATION: 0
WHEEZING: 0
OCCASIONAL FEELINGS OF UNSTEADINESS: 0
DYSURIA: 0

## 2024-05-31 ASSESSMENT — PATIENT HEALTH QUESTIONNAIRE - PHQ9
5. POOR APPETITE OR OVEREATING: NOT AT ALL
1. LITTLE INTEREST OR PLEASURE IN DOING THINGS: MORE THAN HALF THE DAYS
2. FEELING DOWN, DEPRESSED OR HOPELESS: SEVERAL DAYS
8. MOVING OR SPEAKING SO SLOWLY THAT OTHER PEOPLE COULD HAVE NOTICED. OR THE OPPOSITE, BEING SO FIGETY OR RESTLESS THAT YOU HAVE BEEN MOVING AROUND A LOT MORE THAN USUAL: NOT AT ALL
7. TROUBLE CONCENTRATING ON THINGS, SUCH AS READING THE NEWSPAPER OR WATCHING TELEVISION: SEVERAL DAYS
SUM OF ALL RESPONSES TO PHQ QUESTIONS 1-9: 12
4. FEELING TIRED OR HAVING LITTLE ENERGY: NEARLY EVERY DAY
9. THOUGHTS THAT YOU WOULD BE BETTER OFF DEAD, OR OF HURTING YOURSELF: NOT AT ALL
6. FEELING BAD ABOUT YOURSELF - OR THAT YOU ARE A FAILURE OR HAVE LET YOURSELF OR YOUR FAMILY DOWN: MORE THAN HALF THE DAYS
10. IF YOU CHECKED OFF ANY PROBLEMS, HOW DIFFICULT HAVE THESE PROBLEMS MADE IT FOR YOU TO DO YOUR WORK, TAKE CARE OF THINGS AT HOME, OR GET ALONG WITH OTHER PEOPLE: SOMEWHAT DIFFICULT
SUM OF ALL RESPONSES TO PHQ9 QUESTIONS 1 AND 2: 3
3. TROUBLE FALLING OR STAYING ASLEEP OR SLEEPING TOO MUCH: NEARLY EVERY DAY

## 2024-05-31 ASSESSMENT — PAIN SCALES - GENERAL: PAINLEVEL: 6

## 2024-05-31 ASSESSMENT — LIFESTYLE VARIABLES
SKIP TO QUESTIONS 9-10: 1
AUDIT-C TOTAL SCORE: 0
HOW OFTEN DO YOU HAVE A DRINK CONTAINING ALCOHOL: NEVER
HOW OFTEN DO YOU HAVE SIX OR MORE DRINKS ON ONE OCCASION: NEVER
HOW MANY STANDARD DRINKS CONTAINING ALCOHOL DO YOU HAVE ON A TYPICAL DAY: PATIENT DOES NOT DRINK

## 2024-05-31 NOTE — PATIENT INSTRUCTIONS
Highly recommend establishing with Psychiatrist and counselor   Focus on healthy eating including lean proteins and vegetables.  Avoid high carbohydrate high sugar foods and drinks.  Try to increase physical activity as tolerated.  Goal is for 160 minutes/week of physical activity.  Labwork obtained today.  Will address results when available
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
## 2024-06-01 LAB — 25(OH)D3 SERPL-MCNC: 22 NG/ML (ref 30–100)

## 2024-06-04 RX ORDER — ERGOCALCIFEROL 1.25 MG/1
50000 CAPSULE ORAL
Qty: 12 CAPSULE | Refills: 0 | Status: SHIPPED | OUTPATIENT
Start: 2024-06-09 | End: 2024-09-01

## 2024-07-09 PROBLEM — R45.851 DEPRESSION WITH SUICIDAL IDEATION: Status: ACTIVE | Noted: 2021-09-17

## 2024-07-09 PROBLEM — F32.A DEPRESSION WITH SUICIDAL IDEATION: Status: ACTIVE | Noted: 2021-09-17

## 2024-07-09 PROBLEM — F33.2 SEVERE EPISODE OF RECURRENT MAJOR DEPRESSIVE DISORDER, WITHOUT PSYCHOTIC FEATURES (MULTI): Status: ACTIVE | Noted: 2021-09-17

## 2024-07-19 ENCOUNTER — APPOINTMENT (OUTPATIENT)
Dept: PRIMARY CARE | Facility: CLINIC | Age: 34
End: 2024-07-19

## 2024-07-19 VITALS
WEIGHT: 154.4 LBS | SYSTOLIC BLOOD PRESSURE: 122 MMHG | HEIGHT: 62 IN | DIASTOLIC BLOOD PRESSURE: 82 MMHG | BODY MASS INDEX: 28.41 KG/M2 | OXYGEN SATURATION: 99 % | TEMPERATURE: 97.9 F | HEART RATE: 66 BPM

## 2024-07-19 DIAGNOSIS — F33.2 SEVERE EPISODE OF RECURRENT MAJOR DEPRESSIVE DISORDER, WITHOUT PSYCHOTIC FEATURES (MULTI): ICD-10-CM

## 2024-07-19 DIAGNOSIS — F41.9 ANXIETY: Primary | ICD-10-CM

## 2024-07-19 PROCEDURE — 3008F BODY MASS INDEX DOCD: CPT | Performed by: NURSE PRACTITIONER

## 2024-07-19 PROCEDURE — 1036F TOBACCO NON-USER: CPT | Performed by: NURSE PRACTITIONER

## 2024-07-19 PROCEDURE — 99213 OFFICE O/P EST LOW 20 MIN: CPT | Performed by: NURSE PRACTITIONER

## 2024-07-19 RX ORDER — TRAZODONE HYDROCHLORIDE 50 MG/1
50 TABLET ORAL NIGHTLY PRN
COMMUNITY
Start: 2023-10-18 | End: 2024-07-19 | Stop reason: ALTCHOICE

## 2024-07-19 RX ORDER — ONDANSETRON 4 MG/1
4 TABLET, ORALLY DISINTEGRATING ORAL EVERY 8 HOURS PRN
COMMUNITY
Start: 2024-03-29

## 2024-07-19 RX ORDER — METHYLPHENIDATE HYDROCHLORIDE 60 MG/1
1 CAPSULE, EXTENDED RELEASE ORAL
COMMUNITY
Start: 2023-10-18 | End: 2024-07-19 | Stop reason: ALTCHOICE

## 2024-07-19 RX ORDER — LAMOTRIGINE 25 MG/1
2 TABLET ORAL
COMMUNITY
Start: 2023-10-18

## 2024-07-19 RX ORDER — GABAPENTIN 300 MG/1
CAPSULE ORAL
COMMUNITY
Start: 2024-06-04

## 2024-07-19 ASSESSMENT — ENCOUNTER SYMPTOMS
DIARRHEA: 0
CONSTIPATION: 0
BLOOD IN STOOL: 0
SHORTNESS OF BREATH: 0
LOSS OF SENSATION IN FEET: 0
ACTIVITY CHANGE: 0
DIZZINESS: 0
BACK PAIN: 0
COUGH: 0
APPETITE CHANGE: 0
EYE DISCHARGE: 0
PALPITATIONS: 0
NERVOUS/ANXIOUS: 0
WHEEZING: 0
JOINT SWELLING: 0
BRUISES/BLEEDS EASILY: 0
SORE THROAT: 0
ABDOMINAL PAIN: 0
HEADACHES: 0
DEPRESSION: 0
AGITATION: 0
PHOTOPHOBIA: 0
TREMORS: 0
DYSURIA: 0
ADENOPATHY: 0
WEAKNESS: 0
ARTHRALGIAS: 0
SINUS PAIN: 0
OCCASIONAL FEELINGS OF UNSTEADINESS: 0
HEMATURIA: 0

## 2024-07-19 ASSESSMENT — PATIENT HEALTH QUESTIONNAIRE - PHQ9
SUM OF ALL RESPONSES TO PHQ9 QUESTIONS 1 AND 2: 0
1. LITTLE INTEREST OR PLEASURE IN DOING THINGS: NOT AT ALL
2. FEELING DOWN, DEPRESSED OR HOPELESS: NOT AT ALL

## 2024-07-19 ASSESSMENT — LIFESTYLE VARIABLES
HOW MANY STANDARD DRINKS CONTAINING ALCOHOL DO YOU HAVE ON A TYPICAL DAY: PATIENT DOES NOT DRINK
HOW OFTEN DO YOU HAVE A DRINK CONTAINING ALCOHOL: NEVER

## 2024-07-19 ASSESSMENT — PAIN SCALES - GENERAL: PAINLEVEL: 6

## 2024-07-19 NOTE — PROGRESS NOTES
Subjective   Patient ID: Adrienne Christy is a 33 y.o. female who presents for Sciatica (Pt present for sciatica pain,not having the pain today.).    Presents today for follow-up on sciatica pain mostly affecting the right side.  She was given muscle relaxants and naproxen by provider in another area.  She states her symptoms have resolved.  She also has concerns for nausea abdominal pain and cramps during her menstrual cycle.  She reports she has used Pamprin or Midol with improvement in her thumbs.  Others present during the visit for help with history.  Mother notes that patient often times does not take her scheduled medications.  And she has to force her to take her meds.  Discussed this with patient that she needs to be independent with her care and take her medications as ordered.  Offered options such as setting an alarm on her phone and or an alarm clock at home.  She verbalizes understanding and will try this.    Neuropathy    The onset of symptoms is sudden. It is located in the RLE region. The patient experiences pain all day. It lasts 3 weeks. Severity of pain: moderate. Quality of pain: aching, numbing, shooting and tingling. There is no allodynia. There is no hyperalgesia.        Review of Systems   Constitutional:  Negative for activity change and appetite change.   HENT:  Negative for congestion, ear pain, hearing loss, sinus pain and sore throat.    Eyes:  Negative for photophobia, discharge and visual disturbance.   Respiratory:  Negative for cough, shortness of breath and wheezing.    Cardiovascular:  Negative for chest pain, palpitations and leg swelling.   Gastrointestinal:  Negative for abdominal pain, blood in stool, constipation and diarrhea.   Endocrine: Negative for cold intolerance, heat intolerance and polyuria.   Genitourinary:  Negative for dysuria and hematuria.   Musculoskeletal:  Negative for arthralgias, back pain and joint swelling.   Skin:  Negative for rash.   Allergic/Immunologic:  "Negative for environmental allergies and food allergies.   Neurological:  Negative for dizziness, tremors, syncope, weakness and headaches.   Hematological:  Negative for adenopathy. Does not bruise/bleed easily.   Psychiatric/Behavioral:  Negative for agitation and suicidal ideas. The patient is not nervous/anxious.        Objective   /82 (BP Location: Right arm, Patient Position: Sitting)   Pulse 66   Temp 36.6 °C (97.9 °F) (Temporal)   Ht 1.575 m (5' 2\")   Wt 70 kg (154 lb 6.4 oz)   SpO2 99%   BMI 28.24 kg/m²     Physical Exam  Vitals reviewed.   Constitutional:       General: She is not in acute distress.     Appearance: Normal appearance.   HENT:      Head: Normocephalic.      Right Ear: Tympanic membrane normal.      Left Ear: Tympanic membrane normal.      Nose: Nose normal.   Eyes:      Conjunctiva/sclera: Conjunctivae normal.   Cardiovascular:      Rate and Rhythm: Normal rate and regular rhythm.      Heart sounds: Normal heart sounds.   Pulmonary:      Effort: Pulmonary effort is normal.      Breath sounds: Normal breath sounds.   Musculoskeletal:         General: Normal range of motion.   Skin:     General: Skin is warm and dry.   Neurological:      Mental Status: She is alert and oriented to person, place, and time.   Psychiatric:         Mood and Affect: Mood normal.         Speech: Speech normal.       Assessment/Plan   Problem List Items Addressed This Visit             ICD-10-CM    Anxiety - Primary F41.9     Use sertraline every day. Try setting alarms to remind you to take your medications         Severe episode of recurrent major depressive disorder, without psychotic features (Multi) F33.2      Continue naproxen 500 mg twice a day. Or Aleve 2 caplets twice a day as needed for discomfort  Do not take ibuprofen and aleve (naproxen) at the same time  Heat or ice as needed for discomfort. DO NOT SLEEP ON HEATING PAD  OK to use Pamprin or Midol as needed for menstrual cramping and nausea  "

## 2024-07-19 NOTE — PATIENT INSTRUCTIONS
Continue naproxen 500 mg twice a day. Or Aleve 2 caplets twice a day as needed for discomfort  Do not take ibuprofen and aleve (naproxen) at the same time  Heat or ice as needed for discomfort. DO NOT SLEEP ON HEATING PAD  OK to use Pamprin or Midol as needed for menstrual cramping and nausea

## 2024-08-28 ENCOUNTER — APPOINTMENT (OUTPATIENT)
Dept: PRIMARY CARE | Facility: CLINIC | Age: 34
End: 2024-08-28
Payer: MEDICAID

## 2024-08-28 ENCOUNTER — TELEPHONE (OUTPATIENT)
Dept: PRIMARY CARE | Facility: CLINIC | Age: 34
End: 2024-08-28

## 2024-08-28 NOTE — TELEPHONE ENCOUNTER
Call to patient made regarding missed appointment. Left message asking patient to return call to office to get rescheduled.

## 2024-12-03 ENCOUNTER — APPOINTMENT (OUTPATIENT)
Dept: PRIMARY CARE | Facility: CLINIC | Age: 34
End: 2024-12-03
Payer: MEDICAID

## 2025-03-11 DIAGNOSIS — F41.9 ANXIETY: ICD-10-CM

## 2025-03-11 DIAGNOSIS — F33.9 RECURRENT MAJOR DEPRESSIVE DISORDER, REMISSION STATUS UNSPECIFIED (CMS-HCC): Primary | ICD-10-CM

## 2025-03-11 RX ORDER — SERTRALINE HYDROCHLORIDE 50 MG/1
50 TABLET, FILM COATED ORAL DAILY
Qty: 30 TABLET | Refills: 0 | Status: SHIPPED | OUTPATIENT
Start: 2025-03-11 | End: 2025-04-10

## 2025-03-11 RX ORDER — SERTRALINE HYDROCHLORIDE 100 MG/1
100 TABLET, FILM COATED ORAL DAILY
Qty: 30 TABLET | Refills: 0 | Status: SHIPPED | OUTPATIENT
Start: 2025-03-11 | End: 2025-04-10

## 2025-03-11 NOTE — TELEPHONE ENCOUNTER
Pts mom called said the medicaid office won't let her change insurance and they received a denial letter. Pt needs medication refill while they fight to change insurance.  Last DOS 7-19-24

## 2025-04-07 DIAGNOSIS — F41.9 ANXIETY: ICD-10-CM

## 2025-04-07 DIAGNOSIS — F33.9 RECURRENT MAJOR DEPRESSIVE DISORDER, REMISSION STATUS UNSPECIFIED: ICD-10-CM

## 2025-04-07 RX ORDER — SERTRALINE HYDROCHLORIDE 100 MG/1
100 TABLET, FILM COATED ORAL DAILY
Qty: 30 TABLET | Refills: 0 | Status: SHIPPED | OUTPATIENT
Start: 2025-04-07

## 2025-05-01 ENCOUNTER — OFFICE VISIT (OUTPATIENT)
Dept: PRIMARY CARE | Facility: CLINIC | Age: 35
End: 2025-05-01
Payer: COMMERCIAL

## 2025-05-01 VITALS
DIASTOLIC BLOOD PRESSURE: 62 MMHG | OXYGEN SATURATION: 98 % | HEART RATE: 85 BPM | SYSTOLIC BLOOD PRESSURE: 110 MMHG | BODY MASS INDEX: 29.59 KG/M2 | RESPIRATION RATE: 17 BRPM | WEIGHT: 161.8 LBS | TEMPERATURE: 98.2 F

## 2025-05-01 DIAGNOSIS — E55.9 VITAMIN D DEFICIENCY: ICD-10-CM

## 2025-05-01 DIAGNOSIS — E78.00 HYPERCHOLESTEROLEMIA: ICD-10-CM

## 2025-05-01 DIAGNOSIS — R51.9 NONINTRACTABLE HEADACHE, UNSPECIFIED CHRONICITY PATTERN, UNSPECIFIED HEADACHE TYPE: ICD-10-CM

## 2025-05-01 DIAGNOSIS — F33.2 SEVERE EPISODE OF RECURRENT MAJOR DEPRESSIVE DISORDER, WITHOUT PSYCHOTIC FEATURES (MULTI): ICD-10-CM

## 2025-05-01 DIAGNOSIS — F41.9 ANXIETY: Primary | ICD-10-CM

## 2025-05-01 DIAGNOSIS — R53.83 FATIGUE, UNSPECIFIED TYPE: ICD-10-CM

## 2025-05-01 PROCEDURE — 99214 OFFICE O/P EST MOD 30 MIN: CPT | Performed by: NURSE PRACTITIONER

## 2025-05-01 RX ORDER — BISMUTH SUBSALICYLATE 262 MG
1 TABLET,CHEWABLE ORAL DAILY
COMMUNITY

## 2025-05-01 ASSESSMENT — ENCOUNTER SYMPTOMS
SORE THROAT: 0
HEADACHES: 0
ARTHRALGIAS: 0
BACK PAIN: 0
VOMITING: 1
BLOOD IN STOOL: 0
PHOTOPHOBIA: 0
CONSTIPATION: 0
DIARRHEA: 0
ACTIVITY CHANGE: 0
FATIGUE: 1
DYSURIA: 0
WHEEZING: 0
APPETITE CHANGE: 0
NERVOUS/ANXIOUS: 0
WEAKNESS: 0
AGITATION: 0
HEMATURIA: 0
JOINT SWELLING: 0
COUGH: 0
ADENOPATHY: 0
SHORTNESS OF BREATH: 0
SINUS PAIN: 0
DIZZINESS: 0
ABDOMINAL PAIN: 0
BRUISES/BLEEDS EASILY: 0
EYE DISCHARGE: 0
PALPITATIONS: 0
TREMORS: 0

## 2025-05-01 NOTE — PATIENT INSTRUCTIONS
Focus on healthy eating including lean proteins and vegetables.  Avoid high carbohydrate high sugar foods and drinks.  Try to increase physical activity as tolerated.  Goal is for 160 minutes/week of physical activity.  Check blood pressure 2-3 times a week.  Call for blood pressures greater than 140/90.  Bring list of blood pressures to next visit.  Labwork obtained today.  Will address results when available  Try to avoid extreme temperature changes  Follow up with Neurology  Try Excedrin Migraine

## 2025-05-01 NOTE — PROGRESS NOTES
Subjective   Patient ID: Adrienne Christy is a 34 y.o. female who presents for Migraine, Vomiting, and Fatigue.    Presents today with mother with concerns for ongoing frequent headaches.  They do often occur around the time of her menstrual cycle.  She reports they last several hours.  She does have photophobia and sometimes nausea.  She also notes that when she goes from extreme temperature changes especially from very hot to very cool she becomes lightheaded and develops a headache sometimes she even has vomiting.  She does have a history of cranial abnormality at birth surgical intervention.  She would like referral to neurology.  She also reports ongoing fatigue.  She is due for lab work so we will obtain that today we will address results when available.  She and her mother note that they are moved last year has been overall very positive for them.  They note that they are in a much better place they are communicating with each other better and patient reports she is having less rage episodes.    Migraine   Associated symptoms include vomiting. Pertinent negatives include no abdominal pain, back pain, coughing, dizziness, ear pain, hearing loss, photophobia, sore throat or weakness.   Vomiting   Pertinent negatives include no abdominal pain, arthralgias, chest pain, coughing, diarrhea, dizziness or headaches.   Fatigue  Associated symptoms include fatigue and vomiting. Pertinent negatives include no abdominal pain, arthralgias, chest pain, congestion, coughing, headaches, joint swelling, rash, sore throat or weakness.        Review of Systems   Constitutional:  Positive for fatigue. Negative for activity change and appetite change.   HENT:  Negative for congestion, ear pain, hearing loss, sinus pain and sore throat.    Eyes:  Negative for photophobia, discharge and visual disturbance.   Respiratory:  Negative for cough, shortness of breath and wheezing.    Cardiovascular:  Negative for chest pain, palpitations  and leg swelling.   Gastrointestinal:  Positive for vomiting. Negative for abdominal pain, blood in stool, constipation and diarrhea.   Endocrine: Negative for cold intolerance, heat intolerance and polyuria.   Genitourinary:  Negative for dysuria and hematuria.   Musculoskeletal:  Negative for arthralgias, back pain and joint swelling.   Skin:  Negative for rash.   Allergic/Immunologic: Negative for environmental allergies and food allergies.   Neurological:  Negative for dizziness, tremors, syncope, weakness and headaches.   Hematological:  Negative for adenopathy. Does not bruise/bleed easily.   Psychiatric/Behavioral:  Negative for agitation and suicidal ideas. The patient is not nervous/anxious.        Objective   /62   Pulse 85   Temp 36.8 °C (98.2 °F)   Resp 17   Wt 73.4 kg (161 lb 12.8 oz)   LMP 04/14/2025   SpO2 98%   BMI 29.59 kg/m²     Physical Exam  Vitals reviewed.   Constitutional:       General: She is not in acute distress.     Appearance: Normal appearance.   HENT:      Head: Normocephalic.      Right Ear: Tympanic membrane normal.      Left Ear: Tympanic membrane normal.      Nose: Nose normal.   Eyes:      Conjunctiva/sclera: Conjunctivae normal.   Cardiovascular:      Rate and Rhythm: Normal rate and regular rhythm.      Heart sounds: Normal heart sounds.   Pulmonary:      Effort: Pulmonary effort is normal.      Breath sounds: Normal breath sounds.   Musculoskeletal:         General: Normal range of motion.   Skin:     General: Skin is warm and dry.   Neurological:      Mental Status: She is alert and oriented to person, place, and time.   Psychiatric:         Mood and Affect: Mood normal.         Speech: Speech normal.         Assessment/Plan   Problem List Items Addressed This Visit           ICD-10-CM    Anxiety - Primary F41.9    Relevant Orders    CBC and Auto Differential    Comprehensive Metabolic Panel    Follow Up In Primary Care - Established    Severe episode of recurrent  major depressive disorder, without psychotic features (Multi) F33.2    Relevant Orders    CBC and Auto Differential    Comprehensive Metabolic Panel    Follow Up In Primary Care - Established     Other Visit Diagnoses         Codes      Fatigue, unspecified type     R53.83    Relevant Orders    CBC and Auto Differential    Thyroid Stimulating Hormone    Thyroxine, Free    Follow Up In Primary Care - Established      Vitamin D deficiency     E55.9    Relevant Orders    CBC and Auto Differential    Vitamin D 25-Hydroxy,Total (for eval of Vitamin D levels)      Hypercholesterolemia     E78.00    Relevant Orders    Lipid Panel      Nonintractable headache, unspecified chronicity pattern, unspecified headache type     R51.9    Relevant Orders    Referral to Neurology

## 2025-05-03 LAB
25(OH)D3+25(OH)D2 SERPL-MCNC: 10 NG/ML (ref 30–100)
ALBUMIN SERPL-MCNC: 4.6 G/DL (ref 3.6–5.1)
ALP SERPL-CCNC: 65 U/L (ref 31–125)
ALT SERPL-CCNC: 10 U/L (ref 6–29)
ANION GAP SERPL CALCULATED.4IONS-SCNC: 10 MMOL/L (CALC) (ref 7–17)
AST SERPL-CCNC: 12 U/L (ref 10–30)
BASOPHILS # BLD AUTO: 76 CELLS/UL (ref 0–200)
BASOPHILS NFR BLD AUTO: 0.7 %
BILIRUB SERPL-MCNC: 0.3 MG/DL (ref 0.2–1.2)
BUN SERPL-MCNC: 9 MG/DL (ref 7–25)
CALCIUM SERPL-MCNC: 9.5 MG/DL (ref 8.6–10.2)
CHLORIDE SERPL-SCNC: 104 MMOL/L (ref 98–110)
CHOLEST SERPL-MCNC: 190 MG/DL
CHOLEST/HDLC SERPL: 3.3 (CALC)
CO2 SERPL-SCNC: 23 MMOL/L (ref 20–32)
CREAT SERPL-MCNC: 0.57 MG/DL (ref 0.5–0.97)
EGFRCR SERPLBLD CKD-EPI 2021: 124 ML/MIN/1.73M2
EOSINOPHIL # BLD AUTO: 205 CELLS/UL (ref 15–500)
EOSINOPHIL NFR BLD AUTO: 1.9 %
ERYTHROCYTE [DISTWIDTH] IN BLOOD BY AUTOMATED COUNT: 12.6 % (ref 11–15)
GLUCOSE SERPL-MCNC: 102 MG/DL (ref 65–99)
HCT VFR BLD AUTO: 41.7 % (ref 35–45)
HDLC SERPL-MCNC: 57 MG/DL
HGB BLD-MCNC: 13.8 G/DL (ref 11.7–15.5)
LDLC SERPL CALC-MCNC: 115 MG/DL (CALC)
LYMPHOCYTES # BLD AUTO: 4201 CELLS/UL (ref 850–3900)
LYMPHOCYTES NFR BLD AUTO: 38.9 %
MCH RBC QN AUTO: 28 PG (ref 27–33)
MCHC RBC AUTO-ENTMCNC: 33.1 G/DL (ref 32–36)
MCV RBC AUTO: 84.6 FL (ref 80–100)
MONOCYTES # BLD AUTO: 572 CELLS/UL (ref 200–950)
MONOCYTES NFR BLD AUTO: 5.3 %
NEUTROPHILS # BLD AUTO: 5746 CELLS/UL (ref 1500–7800)
NEUTROPHILS NFR BLD AUTO: 53.2 %
NONHDLC SERPL-MCNC: 133 MG/DL (CALC)
PLATELET # BLD AUTO: 440 THOUSAND/UL (ref 140–400)
PMV BLD REES-ECKER: 9.4 FL (ref 7.5–12.5)
POTASSIUM SERPL-SCNC: 4.3 MMOL/L (ref 3.5–5.3)
PROT SERPL-MCNC: 7.7 G/DL (ref 6.1–8.1)
RBC # BLD AUTO: 4.93 MILLION/UL (ref 3.8–5.1)
SODIUM SERPL-SCNC: 137 MMOL/L (ref 135–146)
T4 FREE SERPL-MCNC: 1.1 NG/DL (ref 0.8–1.8)
TRIGL SERPL-MCNC: 82 MG/DL
TSH SERPL-ACNC: 0.94 MIU/L
WBC # BLD AUTO: 10.8 THOUSAND/UL (ref 3.8–10.8)

## 2025-05-05 ENCOUNTER — TELEPHONE (OUTPATIENT)
Dept: PRIMARY CARE | Facility: CLINIC | Age: 35
End: 2025-05-05
Payer: COMMERCIAL

## 2025-05-05 DIAGNOSIS — E55.9 VITAMIN D DEFICIENCY: ICD-10-CM

## 2025-05-05 NOTE — TELEPHONE ENCOUNTER
Pt called said she went to pharmacy to  RX after receiving lab results over the telephone today and they didn't have a RX for Vit D.  I looked at medications tab and it says it is being sent for future fill 5-11-25.  I let patient know this and she wants to know why the wait?

## 2025-05-06 ENCOUNTER — TELEPHONE (OUTPATIENT)
Dept: PRIMARY CARE | Facility: CLINIC | Age: 35
End: 2025-05-06
Payer: COMMERCIAL

## 2025-05-06 RX ORDER — ERGOCALCIFEROL 1.25 MG/1
1.25 CAPSULE ORAL
Qty: 12 CAPSULE | Refills: 0 | Status: SHIPPED | OUTPATIENT
Start: 2025-05-11 | End: 2025-05-07 | Stop reason: SDUPTHER

## 2025-05-06 NOTE — TELEPHONE ENCOUNTER
Pts mom called said she wanted to know why we didn't do a neuro work up for pt when she came in to see you.  Looking for a referral for a neurologist at [facility].  There is a Dr. Phoenix Delcid, Dr. Chioma Rajput, Dr. Jc Cedeno, Dr. Kevin Garcia all at [facility].  She thinks whatever they inserted into her skull when she was 8 weeks old is pushing on her brain causing these vomitting headaches.

## 2025-05-07 ENCOUNTER — TELEPHONE (OUTPATIENT)
Dept: PRIMARY CARE | Facility: CLINIC | Age: 35
End: 2025-05-07
Payer: COMMERCIAL

## 2025-05-07 DIAGNOSIS — E55.9 VITAMIN D DEFICIENCY: ICD-10-CM

## 2025-05-07 RX ORDER — ERGOCALCIFEROL 1.25 MG/1
1.25 CAPSULE ORAL
Qty: 12 CAPSULE | Refills: 0 | Status: SHIPPED | OUTPATIENT
Start: 2025-05-11 | End: 2025-08-03

## 2025-05-07 NOTE — TELEPHONE ENCOUNTER
I left a detailed message on Karlee's cell phone voice mail that a neurology referral has been placed. Centralized scheduling phone number left too.

## 2025-05-08 ENCOUNTER — TELEPHONE (OUTPATIENT)
Dept: PRIMARY CARE | Facility: CLINIC | Age: 35
End: 2025-05-08
Payer: COMMERCIAL

## 2025-05-08 NOTE — TELEPHONE ENCOUNTER
Karlee called stating that she has an appointment tomorrow with Dr Tiara Otoole at noon. This is at the main campus on [facility].

## 2025-05-09 ENCOUNTER — OFFICE VISIT (OUTPATIENT)
Dept: NEUROLOGY | Facility: HOSPITAL | Age: 35
End: 2025-05-09
Payer: COMMERCIAL

## 2025-05-09 VITALS
HEART RATE: 69 BPM | BODY MASS INDEX: 29.44 KG/M2 | WEIGHT: 160 LBS | HEIGHT: 62 IN | SYSTOLIC BLOOD PRESSURE: 100 MMHG | DIASTOLIC BLOOD PRESSURE: 69 MMHG | RESPIRATION RATE: 16 BRPM

## 2025-05-09 DIAGNOSIS — R51.9 NONINTRACTABLE HEADACHE, UNSPECIFIED CHRONICITY PATTERN, UNSPECIFIED HEADACHE TYPE: ICD-10-CM

## 2025-05-09 PROBLEM — R45.851 DEPRESSION WITH SUICIDAL IDEATION: Status: RESOLVED | Noted: 2021-09-17 | Resolved: 2025-05-09

## 2025-05-09 PROBLEM — G43.E09 CHRONIC MIGRAINE WITH AURA WITHOUT STATUS MIGRAINOSUS, NOT INTRACTABLE: Status: ACTIVE | Noted: 2025-05-09

## 2025-05-09 PROBLEM — F32.A DEPRESSION WITH SUICIDAL IDEATION: Status: RESOLVED | Noted: 2021-09-17 | Resolved: 2025-05-09

## 2025-05-09 PROBLEM — Z87.820 HISTORY OF MULTIPLE CONCUSSIONS: Status: ACTIVE | Noted: 2025-05-09

## 2025-05-09 PROCEDURE — 3008F BODY MASS INDEX DOCD: CPT | Performed by: PSYCHIATRY & NEUROLOGY

## 2025-05-09 PROCEDURE — 99254 IP/OBS CNSLTJ NEW/EST MOD 60: CPT | Performed by: PSYCHIATRY & NEUROLOGY

## 2025-05-09 PROCEDURE — 1036F TOBACCO NON-USER: CPT | Performed by: PSYCHIATRY & NEUROLOGY

## 2025-05-09 ASSESSMENT — PAIN SCALES - GENERAL: PAINLEVEL_OUTOF10: 10-WORST PAIN EVER

## 2025-05-09 NOTE — PATIENT INSTRUCTIONS
Keep a calendar of your migraines.    Wear hair loose.      Exercise regularly, get plenty of fresh air and regular, sleep.    Preventative medication should be taken every day regardless of whether or not, you have a headache.  It is to prevent headaches.    Medication to treat the actual headache should be taken as soon as you realize you have a headache.    Medications such as Maxalt, Imitrex, Relpax, Zomig, can be taken only twice a day and they're only nine pills per month.  If you have frequent headaches may need to save these for the worst headaches.    Do not take pain medication every day.  Your body can become addicted to this medication and may make the headaches worse.  If you are having headaches every day, we need to increase the preventative medication, call me if this is becoming a problem.    Avoid foods that can cause migraines.  These include: Red wine, MSG, nitrates (hot dogs, or lunch meats), caffeine, chocolate, cheese.    Monosodium Glutamate (MSG)  MSG also known as Monosodium glutamate is a common food additive. MSG is used to make food taste better, and can be found in processed products, fast food, and canned soups. MSG can cause headaches when consumed in large amounts.    Fast Foods that Contain MSG  Chick-yvon-A's Chicken McCormick   K[employer] Fried Chicken's Extra Crispy Chicken Breast    Chips  (Will be listed on ingredient list Monosodium Glutamate)  Doritos  Pringles  Potato Chips    Meats  Hot dogs    Lunch Meats  Beef jerky    Sausages  Smoked meats    Pepperoni  Meat snack sticks    Wright  Patrasmi    Hamburgers  Cold cuts    Salami  Fried Chicken      Chicken Nuggets  Burgers    Sauces  Ketchup    Mayonnaise  Barbecue sauce   Salad dressing  Soy sauce    Mustard    Miscellaneous  Bodybuilding protein powder  Instant Ramen  Spices    Items that cause headaches without MSG  Alcohol (red wine, beer, whiskey, Scotch, and champagne )  Cheese (Aged Cheeses: including Parmesan, Swiss,  Tasha Del Castillo    I can be reached at phone: [phone] or email: [email or web address] or message me [email or web address]

## 2025-05-09 NOTE — PROGRESS NOTES
"Assessment/Plan:  Migraine without aura: complicated by medication overuse and excessive caffeine.  Discussed a prophylactic medication but topamax previously did not work, BP is low for starting propranolol, and doesn't want an antidepressant that may require changing her sertraline, that is working well for her.  She opts to try weaning first Over the counter analgesics medications and then weaning caffeine first.  She understands the headaches may get worse while she is doing this.  Discussed nonpharmacologic measures to prevent migraine.  Medication overuse headache: taking naproxen 3-4x/week, plus ibuprofen 3-4x/week plus \"menstrual pain relief\" (tylenol, caffiene).  Will wean slowly as per #1 above.     Follow up virtually one month.      HPI:   Adrienne Christy is a 34 y.o. right handed female with history of headaches since as long as she can remember.  She is currently taking ibuprofen, naproxen and \"menstrual pain relief that continues tylenol and caffiene).  She takes then prophylactically at times.  In addition she has been drinking large amounts of caffiene and has frequent headache.  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    Location: start in the back occasionally but usually in the middle  Frequency/Duration: about 15 days per month; last about 12 hours without medication  Photophobia: yes; Phonophobia: yes; Nausea: yes; Vomiting: yes  Aura: no   Focal Symptoms: no   Treatment: ibuprofen, menstrual pain relief, tylenol, cold compress (most effective with medications), epsom soap path, topamax (made her a \"zombie\", no prescription abortive.  Alleviating measures: compress, dark room, lying down  Excerbating factors: heat, sun, light, sounds  Triggers: none  Caffeine: 4 coke/pepsi daily (down from 6 or more). Last drink at 9pm; working till 8  Exercise: walk around the yard, not enough  Sleep: at least 10 hours (to bed at 1am to about 1pm the next day  Menses: worse " before period.    Medications Ordered Prior to Encounter[1]     Problem List[2]     Surgical History[3]     RX Allergies[4]     Family History[5]     Social History[6]    ROS:    General:   Vision:   ENT:   Pulm:   Cardiac:   GI:   Urology:   Rheum:   Derm:   Neuro:   Psych:   Endo:   Hem-Onc:      Objective:    Visit Vitals  /69   Pulse 69   Resp 16        Neurologic Exam:    Well-developed, well-nourished, in no apparent distress. HEENT: normocephalic, atraumatic. No pharyngeal erythema. Neck supple. No carotid bruits. Chest clear to auscultation. No wheezes, rales or rhonchi. Cardiac exam regular rate and rhythm. Normal S1, S2 no rubs, murmurs or gallops. Abdomen nontender, nondistended, positive bowel sounds. Extremities no cyanosis, clubbing, or edema.    Neurological exam: Alert attentive with normal language, orientation, and speech. Reasonable fund of knowledge and memory (recent and remote). Funduscopic exam shows no evidence of papilledema. Pupils equal, round, reactive to light. Visual fields are full to confrontation.  Extraocular eye movements are intact without nystagmus. V1 to 3 is intact to light touch. The face is symmetric.  Hearing is intact to soft voice.  Palate elevates symmetrically. Sternocleidomastoid and trapezius muscles are 5 out of 5 and the tongue is midline. Motor exam: 5 out of 5 throughout normal bulk and tone. No drift on [place]. No orbiting and normal fine finger movements.  Sensory exam: intact to light touch, pinprick, temperature, joint position sense and vibration. Deep tendon reflexes: +2 throughout symmetric. Plantar responses are flexor bilaterally. Cerebellar exam: no evidence of ataxia on finger-nose-finger or heel-knee-shin maneuver. Casual gait and station are normal.     The chart was reviewed and summarized as above.  Neuroimaging was personally reviewed and interpreted as documented in the HPI or Assessment          [1]   Current Outpatient Medications on File  Prior to Visit   Medication Sig Dispense Refill    [START ON 5/11/2025] ergocalciferol (Vitamin D-2) 1250 mcg (50,000 units) capsule Take 1 capsule (1.25 mg) by mouth 1 (one) time per week. 12 capsule 0    ibuprofen 600 mg tablet Take 1 tablet (600 mg) by mouth every 6 hours if needed.      multivitamin tablet Take 1 tablet by mouth once daily.      naproxen (Naprosyn) 500 mg tablet Take 1 tablet (500 mg) by mouth.      sertraline (Zoloft) 100 mg tablet TAKE ONE TABLET BY MOUTH DAILY 30 tablet 0    [DISCONTINUED] ergocalciferol (Vitamin D-2) 1250 mcg (50,000 units) capsule Take 1 capsule (1.25 mg) by mouth 1 (one) time per week. 12 capsule 0    [DISCONTINUED] ergocalciferol (Vitamin D-2) 1250 mcg (50,000 units) capsule Take 1 capsule (1.25 mg) by mouth 1 (one) time per week. 12 capsule 0     No current facility-administered medications on file prior to visit.   [2]   Patient Active Problem List  Diagnosis    Depression, major, recurrent    Anxiety    Severe episode of recurrent major depressive disorder, without psychotic features (Multi)    Chronic migraine with aura without status migrainosus, not intractable   [3]   Past Surgical History:  Procedure Laterality Date    CRANIOPLASTY FOR CRANIAL DEFECT     [4]   Allergies  Allergen Reactions    Tomato Rash   [5]   Family History  Problem Relation Name Age of Onset    Heart attack Mother      Alcohol abuse Father      Hypertension Maternal Grandmother      Stroke Maternal Grandmother      Alzheimer's disease Maternal Grandmother      Heart attack Maternal Grandfather      Stroke Maternal Grandfather      Hypertension Maternal Grandfather      Alzheimer's disease Maternal Grandfather      Psychosis Paternal Grandmother     [6]   Social History  Tobacco Use    Smoking status: Never    Smokeless tobacco: Never   Vaping Use    Vaping status: Never Used   Substance Use Topics    Alcohol use: Never    Drug use: Never

## 2025-05-15 DIAGNOSIS — F33.9 RECURRENT MAJOR DEPRESSIVE DISORDER, REMISSION STATUS UNSPECIFIED: ICD-10-CM

## 2025-05-15 DIAGNOSIS — F41.9 ANXIETY: ICD-10-CM

## 2025-05-15 RX ORDER — GABAPENTIN 300 MG/1
CAPSULE ORAL
COMMUNITY
Start: 2024-06-04

## 2025-05-15 RX ORDER — SERTRALINE HYDROCHLORIDE 100 MG/1
100 TABLET, FILM COATED ORAL DAILY
Qty: 30 TABLET | Refills: 3 | Status: SHIPPED | OUTPATIENT
Start: 2025-05-15

## 2025-06-06 ENCOUNTER — HOSPITAL ENCOUNTER (OUTPATIENT)
Dept: RADIOLOGY | Facility: HOSPITAL | Age: 35
Discharge: HOME | End: 2025-06-06
Payer: COMMERCIAL

## 2025-06-06 DIAGNOSIS — R51.9 NONINTRACTABLE HEADACHE, UNSPECIFIED CHRONICITY PATTERN, UNSPECIFIED HEADACHE TYPE: ICD-10-CM

## 2025-06-06 PROCEDURE — 70551 MRI BRAIN STEM W/O DYE: CPT | Performed by: RADIOLOGY

## 2025-06-06 PROCEDURE — 70551 MRI BRAIN STEM W/O DYE: CPT

## 2025-11-05 ENCOUNTER — APPOINTMENT (OUTPATIENT)
Dept: PRIMARY CARE | Facility: CLINIC | Age: 35
End: 2025-11-05
Payer: COMMERCIAL